# Patient Record
Sex: FEMALE | Race: OTHER | URBAN - METROPOLITAN AREA
[De-identification: names, ages, dates, MRNs, and addresses within clinical notes are randomized per-mention and may not be internally consistent; named-entity substitution may affect disease eponyms.]

---

## 2023-04-06 ENCOUNTER — EMERGENCY (EMERGENCY)
Facility: HOSPITAL | Age: 33
LOS: 0 days | Discharge: HOME | End: 2023-04-06
Attending: STUDENT IN AN ORGANIZED HEALTH CARE EDUCATION/TRAINING PROGRAM
Payer: MEDICAID

## 2023-04-06 VITALS
OXYGEN SATURATION: 98 % | HEART RATE: 85 BPM | DIASTOLIC BLOOD PRESSURE: 65 MMHG | SYSTOLIC BLOOD PRESSURE: 129 MMHG | WEIGHT: 199.96 LBS | TEMPERATURE: 98 F | RESPIRATION RATE: 14 BRPM

## 2023-04-06 VITALS
OXYGEN SATURATION: 100 % | SYSTOLIC BLOOD PRESSURE: 143 MMHG | RESPIRATION RATE: 16 BRPM | DIASTOLIC BLOOD PRESSURE: 65 MMHG | TEMPERATURE: 97 F

## 2023-04-06 PROCEDURE — 99283 EMERGENCY DEPT VISIT LOW MDM: CPT

## 2023-04-06 PROCEDURE — 99284 EMERGENCY DEPT VISIT MOD MDM: CPT

## 2023-04-06 NOTE — ED ADULT NURSE NOTE - NSFALLRSKUNASSIST_ED_ALL_ED
Effects and Expected Time Course of Masculinizing  Hormones      Effect Expected Onset Expected Maximum Effect   Skin oiliness/Acne 1-6 months 1-2 years   Cessation of menses 2-6 months n/a   Clitoral enlargement 3-6 months 1-2 years   Vaginal atrophy 3-6 months 1-2 years   Body fat redistribution 3-6 months 2-5 years   Facial/body hair growth 3-6 months 3-5 years    Deepened voice 3-12 months 1-2 years   Increased muscle mass/strength 6-12 months 2-5 years   Scalp hair loss >12 months+ Variable        supplies, then make a nurse-only appointment for injection teaching.    Follow up in 1 month for follow-up   no

## 2023-04-06 NOTE — ED PROVIDER NOTE - CLINICAL SUMMARY MEDICAL DECISION MAKING FREE TEXT BOX
32-year-old female with no relevant past medical history presenting with dehiscence to the  wound.  States that she had a  at [facility] on , felt a little bit of itching over the last couple of days, today while showering noted there was a small 1 cm opening.  Denies any fevers or significant abdominal pain.  Patient well-appearing, vital stable.  Noted to have approximate 1 cm area of dehiscence, superficial, minimal surrounding erythema, no significant purulent discharge.  No areas of induration.  Abdomen soft nontender nondistended.  OB/GYN consulted, evaluated patient, stable for discharge with oral antibiotics and close follow-up with GYN. bactrim prescription sent to [facility]. Return precautions discussed in detail with the patient.

## 2023-04-06 NOTE — ED PROVIDER NOTE - PHYSICAL EXAMINATION
CONSTITUTIONAL: Well-appearing; well-nourished; in no apparent distress.   EYES: PERRL; EOM intact.   ENT: normal nose; no rhinorrhea; normal pharynx with no tonsillar hypertrophy.   NECK: Supple; non-tender; no cervical lymphadenopathy.  CARDIOVASCULAR: Normal S1, S2; no murmurs, rubs, or gallops. Equal radial pulses  RESPIRATORY: Normal chest excursion with respiration; breath sounds clear and equal bilaterally; no wheezes, rhonchi, or rales.  GI/: Normal bowel sounds; non-distended; non-tender; no palpable organomegaly.   MS: No evidence of trauma or deformity. Normal ROM in all four extremities; non-tender to palpation; distal pulses are normal.   SKIN: 1cm area of wound dehiscence noted to c section site. No purulent drainage. No areas of fluctuance. No overlying erythema/warmth   NEURO/PSYCH: A & O x 4; grossly unremarkable. mood and manner are appropriate.

## 2023-04-06 NOTE — ED PROVIDER NOTE - NSFOLLOWUPINSTRUCTIONS_ED_ALL_ED_FT
PLEASE FOLLOW UP WITH YOUR GYN AS SOON AS POSSIBLE.     Wound Check  ImageIf you have a wound, it may take some time to heal. Eventually, a scar will form. The scar will also fade with time. It is important to take care of your wound while it is healing. This helps to protect your wound from infection.    How should I take care of my wound at home?  Some wounds are allowed to close on their own or are repaired at a later date. There are many different ways to close and cover a wound, including stitches (sutures), skin glue, and adhesive strips. Follow your health care provider's instructions about:    Wound care.  Bandage (dressing) changes and removal.  Wound closure removal.    Take medicines only as directed by your health care provider.  Keep all follow-up visits as directed by your health care provider. This is important.  Do not take baths, swim, or use a hot tub until your health care provider approves. You may shower as directed by your health care provider.  Keep your wound clean and dry.  What affects scar formation?  Scars affect each person differently. How your body scars depends on:    The location and size of your wound.  Traits that you inherited from your parents (genetic predisposition).  How you take care of your wound. Irritation and inflammation increase the amount of scar formation.  Sun exposure. This can darken a scar.    When should I call or see my health care provider?  Call or see your health care provider if:    You have redness, swelling, or pain at your wound site.  You have fluid, blood, or pus coming from your wound.  You have muscle aches, chills, or a general ill feeling.  You notice a bad smell coming from the wound.  Your wound separates after the sutures, staples, or skin adhesive strips have been removed.  You have persistent nausea or vomiting.  You have a fever.  You are dizzy.    When should I call 911 or go to the emergency room?  Call 911 or go to the emergency room if:    You faint.  You have difficulty breathing.    This information is not intended to replace advice given to you by your health care provider. Make sure you discuss any questions you have with your health care provider.

## 2023-04-06 NOTE — ED PROVIDER NOTE - NS ED ATTENDING STATEMENT MOD
This was a shared visit with the LUZ ELENA. I reviewed and verified the documentation and independently performed the documented:

## 2023-04-06 NOTE — CONSULT NOTE ADULT - SUBJECTIVE AND OBJECTIVE BOX
Chief Complaint: wound separation     HPI: 32y P3, s/p repeat  section (), with wound separation. States 2 days ago noticed redness and itching on right side of incision. Place used A&D ointment with no improvement. This morning she noticed separation of the wound at the right edge.  Patient has been lifting her toddler. Denies fevers, chills, nausea, vomiting, abdominal pain.      Ob/Gyn History:   NSVDx1, C/Sx2     Denies the following: constitutional symptoms, visual symptoms, cardiovascular symptoms, respiratory symptoms, GI symptoms, musculoskeletal symptoms, skin symptoms, neurologic symptoms, hematologic symptoms, allergic symptoms, psychiatric symptoms  Except any pertinent positives listed.     Home Medications:  Allergies  Intolerances  PAST MEDICAL & SURGICAL HISTORY:  FAMILY HISTORY:  SOCIAL HISTORY: Denies cigarette use, alcohol use, or illicit drug use    Vital Signs Last 24 Hrs  T(F): 97.3 (2023 15:54), Max: 98.3 (2023 14:21)  HR: 85 (2023 14:21) (85 - 85)  BP: 143/65 (2023 15:54) (129/65 - 143/65)  RR: 16 (2023 15:54) (14 - 16)    Weight (kg): 90.7 (23 @ 14:21)    UO:     General Appearance - AAOx3, NAD  Abdomen - Soft, nontender, nondistended, no rebound, no rigidity, no guarding, bowel sounds present  Pfannenstiel skin incision: R wound dehiscence 1cm wide, 0.5cm deep, with surround erythema 3cm x 3cm, no pus.  Rest of incision intact, nontender.       Meds:       Weight (kg): 90.7 (23 @ 14:21)    LABS:    RADIOLOGY & ADDITIONAL STUDIES:

## 2023-04-06 NOTE — ED PROVIDER NOTE - MDM ORDERS SUBMITTED SELECTION
[facility] for Tobacco Control email [email or web address]
Not Applicable

## 2023-04-06 NOTE — ED PROVIDER NOTE - OBJECTIVE STATEMENT
32 year old F with hx of c section 02/27 At [facility] presenting to er for evaluation of c section scar. Sts noticed itching to area with assoc clear drainage. Denies any assoc abdominal pain, fever/chills, nausea, vomiting, purulent drainage, surrounding redness/warmth.

## 2023-04-06 NOTE — ED PROVIDER NOTE - PATIENT PORTAL LINK FT
You can access the FollowMyHealth Patient Portal offered by [facility] by registering at the following website: [email or web address]. By joining [facility]’s FollowMyHealth portal, you will also be able to view your health information using other applications (apps) compatible with our system.

## 2023-04-06 NOTE — CONSULT NOTE ADULT - ASSESSMENT
32y P3, s/p  delivery by 5 weeks, with small wound dehiscence, hemodynamically and clinically stable.     - No acute gyn intervention  - Wound cleaned, flushed, and steri strip applied   - Recommend Bactrim DS BID x7 days  - Recommend following up with PMD in one week   - Dispo per ED    Dr. Lynch and Dr. Bucio aware.

## 2023-04-06 NOTE — ED PROVIDER NOTE - ATTENDING APP SHARED VISIT CONTRIBUTION OF CARE
I personally evaluated the patient. I reviewed the Resident’s or Physician Assistant’s note (as assigned above), and agree with the findings and plan except as documented in my note.    SEE MDM

## 2023-05-11 ENCOUNTER — NON-APPOINTMENT (OUTPATIENT)
Age: 33
End: 2023-05-11

## 2023-09-25 ENCOUNTER — OUTPATIENT (OUTPATIENT)
Dept: OUTPATIENT SERVICES | Facility: HOSPITAL | Age: 33
LOS: 1 days | End: 2023-09-25
Payer: COMMERCIAL

## 2023-09-25 DIAGNOSIS — K02.9 DENTAL CARIES, UNSPECIFIED: ICD-10-CM

## 2023-09-25 DIAGNOSIS — K01.0 EMBEDDED TEETH: ICD-10-CM

## 2023-09-25 PROCEDURE — D0140: CPT

## 2023-09-25 PROCEDURE — D0330: CPT

## 2023-10-30 ENCOUNTER — NON-APPOINTMENT (OUTPATIENT)
Age: 33
End: 2023-10-30

## 2024-01-04 ENCOUNTER — NON-APPOINTMENT (OUTPATIENT)
Age: 34
End: 2024-01-04

## 2024-02-01 PROBLEM — Z00.00 ENCOUNTER FOR PREVENTIVE HEALTH EXAMINATION: Status: ACTIVE | Noted: 2024-02-01

## 2024-02-02 ENCOUNTER — OUTPATIENT (OUTPATIENT)
Dept: OUTPATIENT SERVICES | Facility: HOSPITAL | Age: 34
LOS: 1 days | End: 2024-02-02
Payer: MEDICAID

## 2024-02-02 ENCOUNTER — APPOINTMENT (OUTPATIENT)
Dept: PODIATRY | Facility: CLINIC | Age: 34
End: 2024-02-02
Payer: MEDICAID

## 2024-02-02 VITALS — BODY MASS INDEX: 34.96 KG/M2 | WEIGHT: 190 LBS | HEIGHT: 62 IN

## 2024-02-02 DIAGNOSIS — Z00.00 ENCOUNTER FOR GENERAL ADULT MEDICAL EXAMINATION WITHOUT ABNORMAL FINDINGS: ICD-10-CM

## 2024-02-02 PROCEDURE — 99204 OFFICE O/P NEW MOD 45 MIN: CPT

## 2024-02-05 ENCOUNTER — NON-APPOINTMENT (OUTPATIENT)
Age: 34
End: 2024-02-05

## 2024-02-09 ENCOUNTER — OUTPATIENT (OUTPATIENT)
Dept: OUTPATIENT SERVICES | Facility: HOSPITAL | Age: 34
LOS: 1 days | End: 2024-02-09
Payer: MEDICAID

## 2024-02-09 ENCOUNTER — RESULT REVIEW (OUTPATIENT)
Age: 34
End: 2024-02-09

## 2024-02-09 DIAGNOSIS — M79.672 PAIN IN LEFT FOOT: ICD-10-CM

## 2024-02-09 DIAGNOSIS — Z00.8 ENCOUNTER FOR OTHER GENERAL EXAMINATION: ICD-10-CM

## 2024-02-09 PROCEDURE — 76881 US COMPL JOINT R-T W/IMG: CPT | Mod: LT

## 2024-02-09 PROCEDURE — 73630 X-RAY EXAM OF FOOT: CPT | Mod: 26,LT

## 2024-02-09 PROCEDURE — 76881 US COMPL JOINT R-T W/IMG: CPT | Mod: 26,LT

## 2024-02-09 PROCEDURE — 73630 X-RAY EXAM OF FOOT: CPT | Mod: LT

## 2024-02-10 DIAGNOSIS — M79.672 PAIN IN LEFT FOOT: ICD-10-CM

## 2024-02-13 NOTE — PHYSICAL EXAM
[Ankle Swelling Bilaterally] : bilaterally  [2+] : left foot dorsalis pedis 2+ [Skin Color & Pigmentation] : normal skin color and pigmentation [Sensation] : the sensory exam was normal to light touch and pinprick [Oriented To Time, Place, And Person] : oriented to person, place, and time [Ankle Swelling (On Exam)] : not present [Varicose Veins Of Lower Extremities] : not present [] : not present [de-identified] : Palpable mass on the dorsum of left midfoot, TTP, partially mobile, well, defined

## 2024-02-13 NOTE — ASSESSMENT
[FreeTextEntry1] : - Rx; Xray, Left foot - Rx; US & MRI left foot  - discussed surgery in detail with patient - RTC in 1 month to discuss treatment options

## 2024-02-15 DIAGNOSIS — M79.672 PAIN IN LEFT FOOT: ICD-10-CM
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
## 2024-02-15 DIAGNOSIS — R22.40 LOCALIZED SWELLING, MASS AND LUMP, UNSPECIFIED LOWER LIMB: ICD-10-CM
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
## 2024-03-04 ENCOUNTER — APPOINTMENT (OUTPATIENT)
Dept: PODIATRY | Facility: CLINIC | Age: 34
End: 2024-03-04
Payer: MEDICAID

## 2024-03-04 ENCOUNTER — OUTPATIENT (OUTPATIENT)
Dept: OUTPATIENT SERVICES | Facility: HOSPITAL | Age: 34
LOS: 1 days | End: 2024-03-04
Payer: MEDICAID

## 2024-03-04 DIAGNOSIS — R22.42 LOCALIZED SWELLING, MASS AND LUMP, LEFT LOWER LIMB: ICD-10-CM

## 2024-03-04 DIAGNOSIS — Z00.00 ENCOUNTER FOR GENERAL ADULT MEDICAL EXAMINATION WITHOUT ABNORMAL FINDINGS: ICD-10-CM

## 2024-03-04 DIAGNOSIS — I73.9 PERIPHERAL VASCULAR DISEASE, UNSPECIFIED: ICD-10-CM
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
## 2024-03-04 PROCEDURE — 99213 OFFICE O/P EST LOW 20 MIN: CPT

## 2024-03-12 NOTE — HISTORY OF PRESENT ILLNESS
[FreeTextEntry1] : PT reports pain to left foot with soft tissue mass.   Patient complains of pain in bilateral legs when walking long distances

## 2024-03-12 NOTE — PHYSICAL EXAM
[2+] : left foot dorsalis pedis 2+ [Sensation] : the sensory exam was normal to light touch and pinprick [FreeTextEntry1] : Left mid dorsal foot soft tissue mass.

## 2024-03-12 NOTE — ASSESSMENT
[FreeTextEntry1] : Patient evaluated history reviewed Left foot subcutaneous mass evaluated Patient complains of intermittent claudication should be evaluated by vascular surgery Patient should follow-up in 3 months

## 2024-03-14 ENCOUNTER — APPOINTMENT (OUTPATIENT)
Dept: VASCULAR SURGERY | Facility: CLINIC | Age: 34
End: 2024-03-14
Payer: MEDICAID

## 2024-03-14 VITALS — WEIGHT: 200 LBS | BODY MASS INDEX: 36.8 KG/M2 | HEIGHT: 62 IN

## 2024-03-14 VITALS — HEART RATE: 77 BPM | SYSTOLIC BLOOD PRESSURE: 118 MMHG | DIASTOLIC BLOOD PRESSURE: 78 MMHG

## 2024-03-14 DIAGNOSIS — I70.213 ATHEROSCLEROSIS OF NATIVE ARTERIES OF EXTREMITIES WITH INTERMITTENT CLAUDICATION, BILATERAL LEGS: ICD-10-CM

## 2024-03-14 PROCEDURE — 93925 LOWER EXTREMITY STUDY: CPT

## 2024-03-14 PROCEDURE — 99203 OFFICE O/P NEW LOW 30 MIN: CPT | Mod: 25

## 2024-03-14 NOTE — HISTORY OF PRESENT ILLNESS
[FreeTextEntry1] : 34 y/o F presents for evaluation of leg heaviness and pain, at times leg swelling, worse after prolonged standing, or walking. She was seen by Podiatry and is scheduled for removal of a mass on left foot. She also c/o lower back pain.

## 2024-03-14 NOTE — ASSESSMENT
[FreeTextEntry1] : 34 y/o F presents for evaluation of leg heaviness and pain, at times leg swelling, worse after prolonged standing, or walking. She was seen by Podiatry and is scheduled for removal of a mass on left foot.  Arterial duplex showed patent femoral, popliteal and tibial arteries bilaterally. No DVT noted in the lower extremities bilaterally. Symptoms could be referred from lower back. She has palpable pulses in the lower extremities bilaterally and no leg swelling on exam today.  No contraindication from a vascular standpoint to undergo foot surgery.  I, Dr. Jourdan Torre, personally performed the evaluation and management (E/M) services for this new patient. That E/M includes conducting the clinically appropriate initial history &/or exam, assessing all conditions, and establishing the plan of care. Today, my LUZ ELENA, Nanda Adler PA-C, was here to observe my evaluation and management service for this patient & follow plan of care established by me going forward.

## 2024-03-14 NOTE — DATA REVIEWED
[FreeTextEntry1] : I performed an arterial duplex which was medically necessary to evaluate for arterial insufficiency. It showed patent femoral, popliteal and tibial arteries bilaterally.   No DVT noted in the lower extremities bilaterally.

## 2024-04-09 ENCOUNTER — NON-APPOINTMENT (OUTPATIENT)
Age: 34
End: 2024-04-09

## 2024-04-11 ENCOUNTER — OUTPATIENT (OUTPATIENT)
Dept: OUTPATIENT SERVICES | Facility: HOSPITAL | Age: 34
LOS: 1 days | End: 2024-04-11
Payer: MEDICAID

## 2024-04-11 VITALS
HEIGHT: 62 IN | RESPIRATION RATE: 16 BRPM | OXYGEN SATURATION: 96 % | HEART RATE: 70 BPM | TEMPERATURE: 98 F | WEIGHT: 205.03 LBS | DIASTOLIC BLOOD PRESSURE: 60 MMHG | SYSTOLIC BLOOD PRESSURE: 100 MMHG

## 2024-04-11 DIAGNOSIS — Z01.818 ENCOUNTER FOR OTHER PREPROCEDURAL EXAMINATION: ICD-10-CM

## 2024-04-11 DIAGNOSIS — Z98.891 HISTORY OF UTERINE SCAR FROM PREVIOUS SURGERY: Chronic | ICD-10-CM

## 2024-04-11 DIAGNOSIS — R22.42 LOCALIZED SWELLING, MASS AND LUMP, LEFT LOWER LIMB: ICD-10-CM

## 2024-04-11 LAB
ALBUMIN SERPL ELPH-MCNC: 4.6 G/DL — SIGNIFICANT CHANGE UP (ref 3.5–5.2)
ALP SERPL-CCNC: 106 U/L — SIGNIFICANT CHANGE UP (ref 30–115)
ALT FLD-CCNC: 10 U/L — SIGNIFICANT CHANGE UP (ref 0–41)
ANION GAP SERPL CALC-SCNC: 15 MMOL/L — HIGH (ref 7–14)
AST SERPL-CCNC: 16 U/L — SIGNIFICANT CHANGE UP (ref 0–41)
BASOPHILS # BLD AUTO: 0.06 K/UL — SIGNIFICANT CHANGE UP (ref 0–0.2)
BASOPHILS NFR BLD AUTO: 0.7 % — SIGNIFICANT CHANGE UP (ref 0–1)
BILIRUB SERPL-MCNC: <0.2 MG/DL — SIGNIFICANT CHANGE UP (ref 0.2–1.2)
BUN SERPL-MCNC: 17 MG/DL — SIGNIFICANT CHANGE UP (ref 10–20)
CALCIUM SERPL-MCNC: 9.5 MG/DL — SIGNIFICANT CHANGE UP (ref 8.4–10.5)
CHLORIDE SERPL-SCNC: 104 MMOL/L — SIGNIFICANT CHANGE UP (ref 98–110)
CO2 SERPL-SCNC: 21 MMOL/L — SIGNIFICANT CHANGE UP (ref 17–32)
CREAT SERPL-MCNC: 0.6 MG/DL — LOW (ref 0.7–1.5)
EGFR: 121 ML/MIN/1.73M2 — SIGNIFICANT CHANGE UP
EOSINOPHIL # BLD AUTO: 0.44 K/UL — SIGNIFICANT CHANGE UP (ref 0–0.7)
EOSINOPHIL NFR BLD AUTO: 5.5 % — SIGNIFICANT CHANGE UP (ref 0–8)
GLUCOSE SERPL-MCNC: 90 MG/DL — SIGNIFICANT CHANGE UP (ref 70–99)
HCT VFR BLD CALC: 31.4 % — LOW (ref 37–47)
HGB BLD-MCNC: 9.3 G/DL — LOW (ref 12–16)
IMM GRANULOCYTES NFR BLD AUTO: 0.2 % — SIGNIFICANT CHANGE UP (ref 0.1–0.3)
LYMPHOCYTES # BLD AUTO: 2.75 K/UL — SIGNIFICANT CHANGE UP (ref 1.2–3.4)
LYMPHOCYTES # BLD AUTO: 34.1 % — SIGNIFICANT CHANGE UP (ref 20.5–51.1)
MCHC RBC-ENTMCNC: 21.4 PG — LOW (ref 27–31)
MCHC RBC-ENTMCNC: 29.6 G/DL — LOW (ref 32–37)
MCV RBC AUTO: 72.2 FL — LOW (ref 81–99)
MONOCYTES # BLD AUTO: 0.55 K/UL — SIGNIFICANT CHANGE UP (ref 0.1–0.6)
MONOCYTES NFR BLD AUTO: 6.8 % — SIGNIFICANT CHANGE UP (ref 1.7–9.3)
NEUTROPHILS # BLD AUTO: 4.24 K/UL — SIGNIFICANT CHANGE UP (ref 1.4–6.5)
NEUTROPHILS NFR BLD AUTO: 52.7 % — SIGNIFICANT CHANGE UP (ref 42.2–75.2)
NRBC # BLD: 0 /100 WBCS — SIGNIFICANT CHANGE UP (ref 0–0)
PLATELET # BLD AUTO: 366 K/UL — SIGNIFICANT CHANGE UP (ref 130–400)
PMV BLD: 12.3 FL — HIGH (ref 7.4–10.4)
POTASSIUM SERPL-MCNC: 4.3 MMOL/L — SIGNIFICANT CHANGE UP (ref 3.5–5)
POTASSIUM SERPL-SCNC: 4.3 MMOL/L — SIGNIFICANT CHANGE UP (ref 3.5–5)
PROT SERPL-MCNC: 7.4 G/DL — SIGNIFICANT CHANGE UP (ref 6–8)
RBC # BLD: 4.35 M/UL — SIGNIFICANT CHANGE UP (ref 4.2–5.4)
RBC # FLD: 17.3 % — HIGH (ref 11.5–14.5)
SODIUM SERPL-SCNC: 140 MMOL/L — SIGNIFICANT CHANGE UP (ref 135–146)
WBC # BLD: 8.06 K/UL — SIGNIFICANT CHANGE UP (ref 4.8–10.8)
WBC # FLD AUTO: 8.06 K/UL — SIGNIFICANT CHANGE UP (ref 4.8–10.8)

## 2024-04-11 PROCEDURE — 36415 COLL VENOUS BLD VENIPUNCTURE: CPT

## 2024-04-11 PROCEDURE — 85025 COMPLETE CBC W/AUTO DIFF WBC: CPT

## 2024-04-11 PROCEDURE — 80053 COMPREHEN METABOLIC PANEL: CPT

## 2024-04-11 PROCEDURE — 99214 OFFICE O/P EST MOD 30 MIN: CPT | Mod: 25

## 2024-04-11 NOTE — H&P PST ADULT - BLOOD TRANSFUSION, PREVIOUS, PROFILE
RE - Provisional Discharge - IRTS - [facility]    Immediate opening is available     Intake interview - 8:30am - 9/13/17 - on unit with Mimi MENDEZ director of above IRTS    This writer spoke with above  first about interviewing for bed available early next week. Spoke with her again before 5pm at which point she reports now has immediate opening would like to see patient in the am. discussed case with her again at length. Will check in with her when she comes to meet with patient    no

## 2024-04-11 NOTE — H&P PST ADULT - HISTORY OF PRESENT ILLNESS
33yr old female states has had a lump on top of the LT foot for about 1 yr " it's gotten bigger and it hurts when I walk a lot" presents to PAST in prep for MASS REMOVAL LEFT FOOT. Denies COVID /URI S/S.  Anesthesia Alert  YES --Difficult Airway CLASS 4  NO--History of neck surgery or radiation  NO--Limited ROM of neck  NO--History of Malignant hyperthermia  NO--Personal or family history of Pseudocholinesterase deficiency  NO--Prior Anesthesia Complication  NO--Latex Allergy  NO--Loose teeth  NO--History of Rheumatoid Arthritis  NO--ADELITA  No Bleeding risk  NO--Other_____   [employer] Activity Status Index (DASI) from [employer]  on 4/11/2024    RESULT SUMMARY:  58.2 points  The higher the score (maximum 58.2), the higher the functional status.    9.89 METs        INPUTS:  Take care of self —> 2.75 = Yes  Walk indoors —> 1.75 = Yes  Walk 1&ndash;2 blocks on level ground —> 2.75 = Yes  Climb a flight of stairs or walk up a hill —> 5.5 = Yes  Run a short distance —> 8 = Yes  Do light work around the house —> 2.7 = Yes  Do moderate work around the house —> 3.5 = Yes  Do heavy work around the house —> 8 = Yes  Do yardwork —> 4.5 = Yes  Have sexual relations —> 5.25 = Yes  Participate in moderate recreational activities —> 6 = Yes  Participate in strenuous sports —> 7.5 = Yes  Revised Cardiac Risk Index for Pre-Operative Risk from [employer]  on 4/11/2024  ** All calculations should be rechecked by clinician prior to use **    RESULT SUMMARY:  0 points  Class I Risk    3.9 %  30-day risk of death, MI, or cardiac arrest      INPUTS:  Elevated-risk surgery —> 0 = No  History of ischemic heart disease —> 0 = No  History of congestive heart failure —> 0 = No  History of cerebrovascular disease —> 0 = No  Pre-operative treatment with insulin —> 0 = No  Pre-operative creatinine >2 mg/dL / 176.8 µmol/L —> 0 = No

## 2024-04-12 DIAGNOSIS — Z01.818 ENCOUNTER FOR OTHER PREPROCEDURAL EXAMINATION: ICD-10-CM

## 2024-04-12 DIAGNOSIS — R22.42 LOCALIZED SWELLING, MASS AND LUMP, LEFT LOWER LIMB: ICD-10-CM

## 2024-04-24 NOTE — ASU PATIENT PROFILE, ADULT - HISTORY OF COVID-19 VACCINATION
----- Message from Fartun Mittal sent at 11/20/2019  8:21 AM PST -----  Regarding: Non-Urgent Medical Question  Contact: [phone]  Ernie Mosquera,  Can I get a refill on my Phentermine?   Thanks,  Krissy   Yes

## 2024-04-25 ENCOUNTER — OUTPATIENT (OUTPATIENT)
Dept: OUTPATIENT SERVICES | Facility: HOSPITAL | Age: 34
LOS: 1 days | Discharge: HOME | End: 2024-04-25
Payer: MEDICAID

## 2024-04-25 ENCOUNTER — TRANSCRIPTION ENCOUNTER (OUTPATIENT)
Age: 34
End: 2024-04-25

## 2024-04-25 ENCOUNTER — RESULT REVIEW (OUTPATIENT)
Age: 34
End: 2024-04-25

## 2024-04-25 VITALS
DIASTOLIC BLOOD PRESSURE: 58 MMHG | HEART RATE: 70 BPM | TEMPERATURE: 98 F | RESPIRATION RATE: 17 BRPM | HEIGHT: 62 IN | OXYGEN SATURATION: 98 % | SYSTOLIC BLOOD PRESSURE: 119 MMHG | WEIGHT: 205.03 LBS

## 2024-04-25 VITALS
HEART RATE: 65 BPM | OXYGEN SATURATION: 100 % | DIASTOLIC BLOOD PRESSURE: 57 MMHG | SYSTOLIC BLOOD PRESSURE: 99 MMHG | RESPIRATION RATE: 14 BRPM

## 2024-04-25 DIAGNOSIS — R22.42 LOCALIZED SWELLING, MASS AND LUMP, LEFT LOWER LIMB: ICD-10-CM

## 2024-04-25 DIAGNOSIS — Z98.891 HISTORY OF UTERINE SCAR FROM PREVIOUS SURGERY: Chronic | ICD-10-CM

## 2024-04-25 PROCEDURE — 28041 EXC FOOT/TOE TUM DEP 1.5CM/>: CPT

## 2024-04-25 PROCEDURE — 88304 TISSUE EXAM BY PATHOLOGIST: CPT | Mod: 26

## 2024-04-25 PROCEDURE — 88304 TISSUE EXAM BY PATHOLOGIST: CPT

## 2024-04-25 RX ORDER — CEFADROXIL 500 MG/1
500 CAPSULE ORAL TWICE DAILY
Qty: 28 | Refills: 0 | Status: ACTIVE | COMMUNITY
Start: 2024-04-25 | End: 1900-01-01

## 2024-04-25 RX ORDER — HYDROMORPHONE HYDROCHLORIDE 2 MG/ML
1 INJECTION INTRAMUSCULAR; INTRAVENOUS; SUBCUTANEOUS
Refills: 0 | Status: DISCONTINUED | OUTPATIENT
Start: 2024-04-25 | End: 2024-04-25

## 2024-04-25 RX ORDER — OXYCODONE AND ACETAMINOPHEN 5; 325 MG/1; MG/1
5-325 TABLET ORAL
Qty: 30 | Refills: 0 | Status: ACTIVE | COMMUNITY
Start: 2024-04-25 | End: 1900-01-01

## 2024-04-25 RX ORDER — SODIUM CHLORIDE 9 MG/ML
1000 INJECTION, SOLUTION INTRAVENOUS
Refills: 0 | Status: DISCONTINUED | OUTPATIENT
Start: 2024-04-25 | End: 2024-04-25

## 2024-04-25 RX ORDER — ONDANSETRON 8 MG/1
4 TABLET, FILM COATED ORAL ONCE
Refills: 0 | Status: DISCONTINUED | OUTPATIENT
Start: 2024-04-25 | End: 2024-04-25

## 2024-04-25 RX ORDER — MEPERIDINE HYDROCHLORIDE 50 MG/ML
12.5 INJECTION INTRAMUSCULAR; INTRAVENOUS; SUBCUTANEOUS ONCE
Refills: 0 | Status: DISCONTINUED | OUTPATIENT
Start: 2024-04-25 | End: 2024-04-25

## 2024-04-25 RX ORDER — HYDROMORPHONE HYDROCHLORIDE 2 MG/ML
0.5 INJECTION INTRAMUSCULAR; INTRAVENOUS; SUBCUTANEOUS
Refills: 0 | Status: DISCONTINUED | OUTPATIENT
Start: 2024-04-25 | End: 2024-04-25

## 2024-04-25 RX ORDER — ACETAMINOPHEN 500 MG
1000 TABLET ORAL ONCE
Refills: 0 | Status: DISCONTINUED | OUTPATIENT
Start: 2024-04-25 | End: 2024-04-25

## 2024-04-25 RX ORDER — OXYCODONE HYDROCHLORIDE 5 MG/1
5 TABLET ORAL ONCE
Refills: 0 | Status: DISCONTINUED | OUTPATIENT
Start: 2024-04-25 | End: 2024-04-25

## 2024-04-25 NOTE — ASU DISCHARGE PLAN (ADULT/PEDIATRIC) - CARE PROVIDER_API CALL
Javy Christianson  Podiatric Medicine and Surgery  [address], [address]  [place] [address]  Phone: [phone]  Fax: [phone]  Established Patient  Follow Up Time: 1 week

## 2024-04-25 NOTE — CHART NOTE - NSCHARTNOTEFT_GEN_A_CORE
PACU ANESTHESIA ADMISSION NOTE    Procedure: Excision of mass of left foot  Post op diagnosis:  Mass of soft tissue of foot    _x___  Patent Airway    _x___  Full return of protective reflexes    _x___  Full recovery from anesthesia / back to baseline status    Vitals:  T(C): 97.6F  HR: 72  BP: 100/52  RR: 19  SpO2: 100%    Mental Status:  __x__ Awake   _x____ Alert   _____ Drowsy   _____ Sedated    Nausea/Vomiting:  __x__ NO  ______Yes,   See Post - Op Orders          Pain Scale (0-10):  _____    Treatment: ____ None    __x__ See Post - Op/PCA Orders    Post - Operative Fluids:   ____ Oral   __x__ See Post - Op Orders    Plan: Discharge:   _x___Home       _____Floor     _____Critical Care    _____  Other:_________________    Comments: uneventful anesthesia course no complications. Vitals stable. Pt transferred to PACU. Discharge home when criteria is met

## 2024-04-26 LAB — SURGICAL PATHOLOGY STUDY: SIGNIFICANT CHANGE UP

## 2024-04-29 PROBLEM — E66.9 OBESITY, UNSPECIFIED: Chronic | Status: ACTIVE | Noted: 2024-04-11

## 2024-05-02 DIAGNOSIS — E66.9 OBESITY, UNSPECIFIED: ICD-10-CM

## 2024-05-02 DIAGNOSIS — M67.472 GANGLION, LEFT ANKLE AND FOOT: ICD-10-CM

## 2024-05-02 DIAGNOSIS — R22.42 LOCALIZED SWELLING, MASS AND LUMP, LEFT LOWER LIMB: ICD-10-CM

## 2024-05-03 ENCOUNTER — OUTPATIENT (OUTPATIENT)
Dept: OUTPATIENT SERVICES | Facility: HOSPITAL | Age: 34
LOS: 1 days | End: 2024-05-03
Payer: MEDICAID

## 2024-05-03 ENCOUNTER — APPOINTMENT (OUTPATIENT)
Dept: PODIATRY | Facility: CLINIC | Age: 34
End: 2024-05-03
Payer: MEDICAID

## 2024-05-03 DIAGNOSIS — Z98.891 HISTORY OF UTERINE SCAR FROM PREVIOUS SURGERY: Chronic | ICD-10-CM

## 2024-05-03 DIAGNOSIS — Z00.00 ENCOUNTER FOR GENERAL ADULT MEDICAL EXAMINATION WITHOUT ABNORMAL FINDINGS: ICD-10-CM

## 2024-05-03 PROCEDURE — 99204 OFFICE O/P NEW MOD 45 MIN: CPT

## 2024-05-03 PROCEDURE — 99024 POSTOP FOLLOW-UP VISIT: CPT

## 2024-05-07 NOTE — HISTORY OF PRESENT ILLNESS
[FreeTextEntry1] : PT reports pain to left foot with soft tissue mass.  Surgical removal 1 week prior of ganglion cyst reports some pain and swelling today but managing with otc ibuprofen

## 2024-05-07 NOTE — ASSESSMENT
[Verbal] : verbal [Patient] : patient [FreeTextEntry1] : Discussed with pt to wrap foot in ACE bandage in order to manage swelling  Continue ambulating with surgical shoe  rtc 1 week for suture removal

## 2024-05-10 ENCOUNTER — OUTPATIENT (OUTPATIENT)
Dept: OUTPATIENT SERVICES | Facility: HOSPITAL | Age: 34
LOS: 1 days | End: 2024-05-10
Payer: MEDICAID

## 2024-05-10 ENCOUNTER — APPOINTMENT (OUTPATIENT)
Dept: PODIATRY | Facility: CLINIC | Age: 34
End: 2024-05-10
Payer: MEDICAID
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
## 2024-05-10 DIAGNOSIS — R22.42 LOCALIZED SWELLING, MASS AND LUMP, LEFT LOWER LIMB: ICD-10-CM

## 2024-05-10 DIAGNOSIS — Z98.891 HISTORY OF UTERINE SCAR FROM PREVIOUS SURGERY: Chronic | ICD-10-CM
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
## 2024-05-10 DIAGNOSIS — Z00.00 ENCOUNTER FOR GENERAL ADULT MEDICAL EXAMINATION WITHOUT ABNORMAL FINDINGS: ICD-10-CM

## 2024-05-10 PROCEDURE — 99204 OFFICE O/P NEW MOD 45 MIN: CPT

## 2024-05-10 PROCEDURE — 99024 POSTOP FOLLOW-UP VISIT: CPT

## 2024-05-20 PROBLEM — R22.42 SUBCUTANEOUS MASS OF LEFT FOOT: Status: ACTIVE | Noted: 2024-02-02

## 2024-05-20 NOTE — HISTORY OF PRESENT ILLNESS
[Sneakers] : hola [FreeTextEntry1] : PT reports pain to left foot with soft tissue mass.  Surgical removal 2 weeks prior of ganglion cyst reports some pain and swelling today but managing with otc ibuprofen

## 2024-05-20 NOTE — PHYSICAL EXAM
[General Appearance - Alert] : alert [General Appearance - In No Acute Distress] : in no acute distress [2+] : left foot dorsalis pedis 2+ [Sensation] : the sensory exam was normal to light touch and pinprick [Delayed in the Right Toes] : capillary refills normal in right toes [Delayed in the Left Toes] : capillary refills normal in the left toes [FreeTextEntry1] : Left foot dorsum surgical site with sutures intact and well co-apted skin no opening, no drainage noted mild surrounding edema non erythema or SOI

## 2024-05-20 NOTE — ASSESSMENT
[Verbal] : verbal [Patient] : patient [FreeTextEntry1] : S/p ganglion cyst removal Left foot  Sutures removed Steri strips applied Instructed patient to continue keeping it dry and covered RTC 2 weeks

## 2024-05-24 ENCOUNTER — APPOINTMENT (OUTPATIENT)
Dept: PODIATRY | Facility: CLINIC | Age: 34
End: 2024-05-24

## 2024-05-24 DIAGNOSIS — R22.42 LOCALIZED SWELLING, MASS AND LUMP, LEFT LOWER LIMB: ICD-10-CM

## 2024-06-27 ENCOUNTER — APPOINTMENT (OUTPATIENT)
Dept: PODIATRY | Facility: CLINIC | Age: 34
End: 2024-06-27
Payer: MEDICAID

## 2024-06-27 ENCOUNTER — OUTPATIENT (OUTPATIENT)
Dept: OUTPATIENT SERVICES | Facility: HOSPITAL | Age: 34
LOS: 1 days | End: 2024-06-27
Payer: MEDICAID

## 2024-06-27 DIAGNOSIS — Z98.891 HISTORY OF UTERINE SCAR FROM PREVIOUS SURGERY: Chronic | ICD-10-CM

## 2024-06-27 DIAGNOSIS — L97.521 NON-PRESSURE CHRONIC ULCER OF OTHER PART OF LEFT FOOT LIMITED TO BREAKDOWN OF SKIN: ICD-10-CM

## 2024-06-27 DIAGNOSIS — Z00.00 ENCOUNTER FOR GENERAL ADULT MEDICAL EXAMINATION WITHOUT ABNORMAL FINDINGS: ICD-10-CM

## 2024-06-27 DIAGNOSIS — M79.672 PAIN IN LEFT FOOT: ICD-10-CM

## 2024-06-27 PROCEDURE — 99213 OFFICE O/P EST LOW 20 MIN: CPT | Mod: 24
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
## 2024-06-28 DIAGNOSIS — L97.521 NON-PRESSURE CHRONIC ULCER OF OTHER PART OF LEFT FOOT LIMITED TO BREAKDOWN OF SKIN: ICD-10-CM

## 2024-06-28 DIAGNOSIS — M79.672 PAIN IN LEFT FOOT: ICD-10-CM
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
## 2024-10-03 ENCOUNTER — NON-APPOINTMENT (OUTPATIENT)
Age: 34
End: 2024-10-03

## 2024-10-13 ENCOUNTER — NON-APPOINTMENT (OUTPATIENT)
Age: 34
End: 2024-10-13

## 2025-03-14 ENCOUNTER — NON-APPOINTMENT (OUTPATIENT)
Age: 35
End: 2025-03-14

## 2025-03-29 ENCOUNTER — NON-APPOINTMENT (OUTPATIENT)
Age: 35
End: 2025-03-29

## 2025-07-29 ENCOUNTER — EMERGENCY (EMERGENCY)
Facility: HOSPITAL | Age: 35
LOS: 0 days | Discharge: HOME | End: 2025-07-30
Attending: EMERGENCY MEDICINE
Payer: MEDICAID

## 2025-07-29 VITALS
OXYGEN SATURATION: 99 % | HEIGHT: 62 IN | WEIGHT: 201.72 LBS | HEART RATE: 81 BPM | SYSTOLIC BLOOD PRESSURE: 147 MMHG | TEMPERATURE: 98 F | DIASTOLIC BLOOD PRESSURE: 88 MMHG | RESPIRATION RATE: 18 BRPM

## 2025-07-29 DIAGNOSIS — Z91.018 ALLERGY TO OTHER FOODS: ICD-10-CM

## 2025-07-29 DIAGNOSIS — R10.30 LOWER ABDOMINAL PAIN, UNSPECIFIED: ICD-10-CM

## 2025-07-29 DIAGNOSIS — O34.81 MATERNAL CARE FOR OTHER ABNORMALITIES OF PELVIC ORGANS, FIRST TRIMESTER: ICD-10-CM

## 2025-07-29 DIAGNOSIS — Z98.891 HISTORY OF UTERINE SCAR FROM PREVIOUS SURGERY: Chronic | ICD-10-CM

## 2025-07-29 DIAGNOSIS — O99.891 OTHER SPECIFIED DISEASES AND CONDITIONS COMPLICATING PREGNANCY: ICD-10-CM

## 2025-07-29 DIAGNOSIS — R74.8 ABNORMAL LEVELS OF OTHER SERUM ENZYMES: ICD-10-CM

## 2025-07-29 DIAGNOSIS — O20.8 OTHER HEMORRHAGE IN EARLY PREGNANCY: ICD-10-CM

## 2025-07-29 DIAGNOSIS — N83.201 UNSPECIFIED OVARIAN CYST, RIGHT SIDE: ICD-10-CM

## 2025-07-29 DIAGNOSIS — Z3A.10 10 WEEKS GESTATION OF PREGNANCY: ICD-10-CM

## 2025-07-29 DIAGNOSIS — O20.0 THREATENED ABORTION: ICD-10-CM

## 2025-07-29 LAB
APPEARANCE UR: CLEAR — SIGNIFICANT CHANGE UP
BASOPHILS # BLD AUTO: 0.05 K/UL — SIGNIFICANT CHANGE UP (ref 0–0.2)
BASOPHILS NFR BLD AUTO: 0.6 % — SIGNIFICANT CHANGE UP (ref 0–1)
BILIRUB UR-MCNC: NEGATIVE — SIGNIFICANT CHANGE UP
COLOR SPEC: YELLOW — SIGNIFICANT CHANGE UP
DIFF PNL FLD: ABNORMAL
EOSINOPHIL # BLD AUTO: 0.19 K/UL — SIGNIFICANT CHANGE UP (ref 0–0.7)
EOSINOPHIL NFR BLD AUTO: 2.1 % — SIGNIFICANT CHANGE UP (ref 0–8)
GLUCOSE UR QL: NEGATIVE MG/DL — SIGNIFICANT CHANGE UP
HCT VFR BLD CALC: 39 % — SIGNIFICANT CHANGE UP (ref 37–47)
HGB BLD-MCNC: 12.6 G/DL — SIGNIFICANT CHANGE UP (ref 12–16)
IMM GRANULOCYTES NFR BLD AUTO: 0.3 % — SIGNIFICANT CHANGE UP (ref 0.1–0.3)
KETONES UR QL: NEGATIVE MG/DL — SIGNIFICANT CHANGE UP
LEUKOCYTE ESTERASE UR-ACNC: NEGATIVE — SIGNIFICANT CHANGE UP
LYMPHOCYTES # BLD AUTO: 2.51 K/UL — SIGNIFICANT CHANGE UP (ref 1.2–3.4)
LYMPHOCYTES # BLD AUTO: 28.3 % — SIGNIFICANT CHANGE UP (ref 20.5–51.1)
MCHC RBC-ENTMCNC: 26.7 PG — LOW (ref 27–31)
MCHC RBC-ENTMCNC: 32.3 G/DL — SIGNIFICANT CHANGE UP (ref 32–37)
MCV RBC AUTO: 82.6 FL — SIGNIFICANT CHANGE UP (ref 81–99)
MONOCYTES # BLD AUTO: 0.73 K/UL — HIGH (ref 0.1–0.6)
MONOCYTES NFR BLD AUTO: 8.2 % — SIGNIFICANT CHANGE UP (ref 1.7–9.3)
NEUTROPHILS # BLD AUTO: 5.37 K/UL — SIGNIFICANT CHANGE UP (ref 1.4–6.5)
NEUTROPHILS NFR BLD AUTO: 60.5 % — SIGNIFICANT CHANGE UP (ref 42.2–75.2)
NITRITE UR-MCNC: NEGATIVE — SIGNIFICANT CHANGE UP
NRBC BLD AUTO-RTO: 0 /100 WBCS — SIGNIFICANT CHANGE UP (ref 0–0)
PH UR: 7 — SIGNIFICANT CHANGE UP (ref 5–8)
PLATELET # BLD AUTO: 310 K/UL — SIGNIFICANT CHANGE UP (ref 130–400)
PMV BLD: 12.1 FL — HIGH (ref 7.4–10.4)
PROT UR-MCNC: NEGATIVE MG/DL — SIGNIFICANT CHANGE UP
RBC # BLD: 4.72 M/UL — SIGNIFICANT CHANGE UP (ref 4.2–5.4)
RBC # FLD: 14.6 % — HIGH (ref 11.5–14.5)
SP GR SPEC: 1 — SIGNIFICANT CHANGE UP (ref 1–1.03)
UROBILINOGEN FLD QL: 0.2 MG/DL — SIGNIFICANT CHANGE UP (ref 0.2–1)
WBC # BLD: 8.88 K/UL — SIGNIFICANT CHANGE UP (ref 4.8–10.8)
WBC # FLD AUTO: 8.88 K/UL — SIGNIFICANT CHANGE UP (ref 4.8–10.8)

## 2025-07-29 PROCEDURE — 36415 COLL VENOUS BLD VENIPUNCTURE: CPT

## 2025-07-29 PROCEDURE — 86850 RBC ANTIBODY SCREEN: CPT

## 2025-07-29 PROCEDURE — 99284 EMERGENCY DEPT VISIT MOD MDM: CPT

## 2025-07-29 PROCEDURE — 76817 TRANSVAGINAL US OBSTETRIC: CPT

## 2025-07-29 PROCEDURE — 76815 OB US LIMITED FETUS(S): CPT

## 2025-07-29 PROCEDURE — 86901 BLOOD TYPING SEROLOGIC RH(D): CPT

## 2025-07-29 PROCEDURE — 87086 URINE CULTURE/COLONY COUNT: CPT

## 2025-07-29 PROCEDURE — 81001 URINALYSIS AUTO W/SCOPE: CPT

## 2025-07-29 PROCEDURE — 99284 EMERGENCY DEPT VISIT MOD MDM: CPT | Mod: 25

## 2025-07-29 PROCEDURE — 86900 BLOOD TYPING SEROLOGIC ABO: CPT

## 2025-07-29 PROCEDURE — 80053 COMPREHEN METABOLIC PANEL: CPT

## 2025-07-29 PROCEDURE — 85025 COMPLETE CBC W/AUTO DIFF WBC: CPT

## 2025-07-29 PROCEDURE — 84702 CHORIONIC GONADOTROPIN TEST: CPT

## 2025-07-29 RX ADMIN — Medication 1000 MILLILITER(S): at 23:06

## 2025-07-29 NOTE — ED PROVIDER NOTE - PHYSICAL EXAMINATION
As Follows:  CONST: Well appearing in NAD  EYES: PERRL, EOMI, Sclera and conjunctiva clear.   ENT:  No nasal discharge. Oropharynx normal appearing, no erythema / exudates. Uvula midline. Airway intact.   CARD: No murmurs, rubs, or gallops; Normal rate and rhythm  RESP: BS Equal B/L, No wheezes, rhonchi or rales. No distress or accessory breathing  GI: RLQ tenderness with suprapubic discomfort. Soft, non-distended.   SKIN: Warm, dry, no acute rashes. MMM  NEURO: Alert and Oriented, No focal deficits. Strength and sensation intact. Steady gait

## 2025-07-29 NOTE — ED PROVIDER NOTE - OBJECTIVE STATEMENT
Patient is a 34 year old female with no pmhx currently , LMP  at approx 10 weeks gestation presents for evaluation of lower abdominal to right lower back discomfort  with episode of vaginal bleeding today after 8am. She reports intrauterine pregnancy confirmed via vaginal US done about 2 weeks ago with [facility]. She denies any other complaints including dysuria/ urgency/ frequency/ or continued vaginal bleeding.

## 2025-07-29 NOTE — ED PROVIDER NOTE - CLINICAL SUMMARY MEDICAL DECISION MAKING FREE TEXT BOX
34 year old female with no PMH currently , LMP  at approx 10 weeks gestation presents for evaluation of lower abdominal to right lower back discomfort  with episode of vaginal bleeding today after 8am. She reports intrauterine pregnancy confirmed via vaginal US done about 2 weeks ago with [facility]el[facility] Ojeda OB G[facility]. She denies any other complaints including dysuria/ urgency/ frequency/ or continued vaginal bleeding. Only 1 episode of vaginal bleed, no bleeding in the ER. Pt had labs, urine, US --noted to have some blood in urine but no infection, US shows 4.5 cm right ovarian cyst which could be cause of her pain. +small MARRY but pt is blood type O+, no rhogam necessary. Bleeding resolved. Pt requesting to go home. Will follow up with her OB/GYN. Return to ER for any worsening of symptoms.

## 2025-07-29 NOTE — ED PROVIDER NOTE - NSFOLLOWUPINSTRUCTIONS_ED_ALL_ED_FT
[facility] Care Support Interaction      I have reviewed the chart of Wilmer Aponte who is hospitalized for Sepsis. The patient is currently located in the following unit: [facility]        I have assisted the primary physician in management of the following:      S/P OHTX, immunosuppressed- daily tacro level ordered (to be drawn prior to morning dose), enhanced respiratory precautions, blood cultures x 2 (done at OSH prior to transfer as well, has already received IV abx)        Nikki Sanchez PA-C  [facility]       Please follow up with your primary care doctor and OB/GYN in 1-3 days   Please be aware of any new or worsening signs or symptoms that should prompt your return to the ER.    Threatened Miscarriage    A threatened miscarriage occurs when you have vaginal bleeding during your first 20 weeks of pregnancy but the pregnancy has not ended. If you have vaginal bleeding during this time, your health care provider will do tests to make sure you are still pregnant. If the tests show you are still pregnant and the developing baby (fetus) inside your womb (uterus) is still growing, your condition is considered a threatened miscarriage.    A threatened miscarriage does not mean your pregnancy will end, but it does increase the risk of losing your pregnancy (complete miscarriage).    CAUSES  The cause of a threatened miscarriage is usually not known. If you go on to have a complete miscarriage, the most common cause is an abnormal number of chromosomes in the developing baby. Chromosomes are the structures inside cells that hold all your genetic material.    Some causes of vaginal bleeding that do not result in miscarriage include:    Having sex.  Having an infection.  Normal hormone changes of pregnancy.  Bleeding that occurs when an egg implants in your uterus.    RISK FACTORS  Risk factors for bleeding in early pregnancy include:    Obesity.  Smoking.  Drinking excessive amounts of alcohol or caffeine.  Recreational drug use.    SIGNS AND SYMPTOMS  Light vaginal bleeding.   Mild abdominal pain or cramps.     DIAGNOSIS  If you have bleeding with or without abdominal pain before 20 weeks of pregnancy, your health care provider will do tests to check whether you are still pregnant. One important test involves using sound waves and a computer (ultrasound) to create images of the inside of your uterus. Other tests include an internal exam of your vagina and uterus (pelvic exam) and measurement of your baby's heart rate.     You may be diagnosed with a threatened miscarriage if:    Ultrasound testing shows you are still pregnant.  Your baby's heart rate is strong.  A pelvic exam shows that the opening between your uterus and your vagina (cervix) is closed.  Your heart rate and blood pressure are stable.  Blood tests confirm you are still pregnant.    TREATMENT  No treatments have been shown to prevent a threatened miscarriage from going on to a complete miscarriage. However, the right home care is important.     HOME CARE INSTRUCTIONS  Make sure you keep all your appointments for prenatal care. This is very important.  Get plenty of rest.  Do not have sex or use tampons if you have vaginal bleeding.  Do not douche.  Do not smoke or use recreational drugs.   Do not drink alcohol.   Avoid caffeine.     SEEK MEDICAL CARE IF:  You have light vaginal bleeding or spotting while pregnant.   You have abdominal pain or cramping.   You have a fever.     SEEK IMMEDIATE MEDICAL CARE IF:  You have heavy vaginal bleeding.   You have blood clots coming from your vagina.   You have severe low back pain or abdominal cramps.   You have fever, chills, and severe abdominal pain.     MAKE SURE YOU:  Understand these instructions.  Will watch your condition.  Will get help right away if you are not doing well or get worse.    ADDITIONAL NOTES AND INSTRUCTIONS    Please follow up with your Primary MD in 24-48 hr.  Seek immediate medical care for any new/worsening signs or symptoms.      Subchorionic Hematoma    A subchorionic hematoma is a gathering of blood between the outer wall of the placenta and the inner wall of the womb (uterus). The placenta is the organ that connects the fetus to the wall of the uterus. The placenta performs the feeding, breathing (oxygen to the fetus), and waste removal (excretory work) of the fetus.     Subchorionic hematoma is the most common abnormality found on a result from ultrasonography done during the first trimester or early second trimester of pregnancy. If there has been little or no vaginal bleeding, early small hematomas usually shrink on their own and do not affect your baby or pregnancy. The blood is gradually absorbed over 1–2 weeks. When bleeding starts later in pregnancy or the hematoma is larger or occurs in an older pregnant woman, the outcome may not be as good. Larger hematomas may get bigger, which increases the chances for miscarriage. Subchorionic hematoma also increases the risk of premature detachment of the placenta from the uterus,  (premature) labor, and stillbirth.    HOME CARE INSTRUCTIONS  Stay on bed rest if your health care provider recommends this. Although bed rest will not prevent more bleeding or prevent a miscarriage, your health care provider may recommend bed rest until you are advised otherwise.  Avoid heavy lifting (more than 10 lb [4.5 kg]), exercise, sexual intercourse, or douching as directed by your health care provider.  Keep track of the number of pads you use each day and how soaked (saturated) they are. Write down this information.  Do not use tampons.  Keep all follow-up appointments as directed by your health care provider. Your health care provider may ask you to have follow-up blood tests or ultrasound tests or both.    SEEK IMMEDIATE MEDICAL CARE IF:  You have severe cramps in your stomach, back, abdomen, or pelvis.  You have a fever.  You pass large clots or tissue. Save any tissue for your health care provider to look at.  Your bleeding increases or you become lightheaded, feel weak, or have fainting episodes.    ADDITIONAL NOTES AND INSTRUCTIONS    Please follow up with your Primary MD in 24-48 hr.  Seek immediate medical care for any new/worsening signs or symptoms.    Ovarian Cyst    WHAT YOU NEED TO KNOW:    An ovarian cyst is a sac that grows on an ovary. This sac usually contains fluid, but may sometimes have blood or tissue in it. Most ovarian cysts are harmless and go away without treatment in a few months. Some cysts can grow large, cause pain, or break open.     DISCHARGE INSTRUCTIONS:    Call 911 for any of the following:     You are too weak or dizzy to stand up.        Return to the emergency department if:     You have severe abdominal pain. The pain may be sharp and sudden.      You have a fever.    Contact your healthcare provider if:     Your periods are early, late, or more painful than usual.      You have bleeding from your vagina that is not your period.      You have abdominal pain all the time.      Your abdomen is swollen.      You have feelings of fullness, pressure, or discomfort in your abdomen.      You have trouble urinating or emptying your bladder completely.      You have pain during sex.      You are losing weight without trying.      You have questions or concerns about your condition or care.    Medicines: You may need any of the following:     NSAIDs, such as ibuprofen, help decrease swelling, pain, and fever. This medicine is available with or without a doctor's order. NSAIDs can cause stomach bleeding or kidney problems in certain people. If you take blood thinner medicine, always ask if NSAIDs are safe for you. Always read the medicine label and follow directions. Do not give these medicines to children under 6 months of age without direction from your child's healthcare provider.      Birth control pills may help to control your periods, prevent cysts, or cause them to shrink.      Take your medicine as directed. Contact your healthcare provider if you think your medicine is not helping or if you have side effects. Tell him or her if you are allergic to any medicine. Keep a list of the medicines, vitamins, and herbs you take. Include the amounts, and when and why you take them. Bring the list or the pill bottles to follow-up visits. Carry your medicine list with you in case of an emergency.    Follow up with your healthcare provider as directed: Write down your questions so you remember to ask them during your visits.     Apply heat to decrease pain and cramping: Sit in a warm bath, or place a heating pad (turned on low) or a hot water bottle on your abdomen. Do this for 15 to 20 minutes every hour for as many days as directed.

## 2025-07-29 NOTE — ED PROVIDER NOTE - PATIENT PORTAL LINK FT
You can access the FollowMyHealth Patient Portal offered by [facility] by registering at the following website: [email or web address]. By joining [employer]’s FollowMyHealth portal, you will also be able to view your health information using other applications (apps) compatible with our system.

## 2025-07-29 NOTE — ED ADULT NURSE NOTE - PAIN RATING/NUMBER SCALE (0-10): ACTIVITY
Colposcopy  Date/Time: 7/27/2020 11:28 AM  Performed by: Sherley Kanner, MD  Authorized by: Sherley Kanner, MD     Consent:     Consent obtained:  Written    Consent given by:  Patient    Procedural risks discussed:  Bleeding    Patient questions answered: yes      Patient agrees, verbalizes understanding, and wants to proceed: yes    Pre-procedure:     Prepped with: acetic acid    Indication:     Indication:  LSIL  Procedure:     Procedure: Colposcopy of vagina w/ biopsy      Procedure comment:  Colposcopy of cervix and endocervical  biopsy    [place] speculum was placed in the vagina: yes      Under colposcopic examination the transition zone was seen in entirety: yes      Intracervical block was performed: no      Tampon inserted: no      Monsel's solution was applied: yes      Biopsy(s): yes      Location:  Right vaginal wall, and ECC    Specimen to pathology: yes    Post-procedure:     Findings: Bleeding      Impression: Low grade cervical dysplasia    Comments:      Patient had an ASCUS Pap with infection December 2019  Patient was treated with Metrogel and return to the office for repeat Pap smear which showed low-grade ASAD  On colposcopy today no lesions were seen on the cervix or in the endocervical canal   A flat aceto-white lesions were seen on the left and right vaginal wall towards the cervix  Lesions on the right were worse than the left  Biopsy was taken from the right vaginal wall    Impression:  VAIN  Plan:  Check biopsy  Patient is moving to [facility]    Will get the results to the patient as soon as they are reviewed
14-May-2023
0 (no pain/absence of nonverbal indicators of pain)

## 2025-07-29 NOTE — ED PROVIDER NOTE - CARE PLAN
Principal Discharge DX:	Threatened miscarriage   1 Principal Discharge DX:	Threatened miscarriage  Secondary Diagnosis:	Subchorionic hematoma  Secondary Diagnosis:	Ovarian cyst   Principal Discharge DX:	Threatened miscarriage  Secondary Diagnosis:	Subchorionic hematoma  Secondary Diagnosis:	Ovarian cyst  Secondary Diagnosis:	Elevated liver enzymes

## 2025-07-29 NOTE — ED ADULT TRIAGE NOTE - CHIEF COMPLAINT QUOTE
Pt c/o 10 weeks pregnant. Pt c/o soreness to pelvis started this morning and c/o vaginal bleeding around 9:20 pm. LMP 5/21/25

## 2025-07-29 NOTE — ED ADULT NURSE NOTE - OBJECTIVE STATEMENT
Pt A&Ox4; 10 weeks pregnant; endorses soreness to pelvis started this morning and c/o vaginal bleeding around 9pm

## 2025-07-30 VITALS
TEMPERATURE: 98 F | SYSTOLIC BLOOD PRESSURE: 112 MMHG | DIASTOLIC BLOOD PRESSURE: 72 MMHG | OXYGEN SATURATION: 99 % | RESPIRATION RATE: 16 BRPM | HEART RATE: 71 BPM

## 2025-07-30 LAB
ABO RH CONFIRMATION: SIGNIFICANT CHANGE UP
ALBUMIN SERPL ELPH-MCNC: 4 G/DL — SIGNIFICANT CHANGE UP (ref 3.5–5.2)
ALBUMIN SERPL ELPH-MCNC: 4.4 G/DL — SIGNIFICANT CHANGE UP (ref 3.5–5.2)
ALP SERPL-CCNC: 110 U/L — SIGNIFICANT CHANGE UP (ref 30–115)
ALP SERPL-CCNC: 98 U/L — SIGNIFICANT CHANGE UP (ref 30–115)
ALT FLD-CCNC: 25 U/L — SIGNIFICANT CHANGE UP (ref 0–41)
ALT FLD-CCNC: 26 U/L — SIGNIFICANT CHANGE UP (ref 0–41)
ANION GAP SERPL CALC-SCNC: 11 MMOL/L — SIGNIFICANT CHANGE UP (ref 7–14)
ANION GAP SERPL CALC-SCNC: 8 MMOL/L — SIGNIFICANT CHANGE UP (ref 7–14)
AST SERPL-CCNC: 56 U/L — HIGH (ref 0–41)
AST SERPL-CCNC: 63 U/L — HIGH (ref 0–41)
BILIRUB SERPL-MCNC: <0.2 MG/DL — SIGNIFICANT CHANGE UP (ref 0.2–1.2)
BILIRUB SERPL-MCNC: <0.2 MG/DL — SIGNIFICANT CHANGE UP (ref 0.2–1.2)
BUN SERPL-MCNC: 12 MG/DL — SIGNIFICANT CHANGE UP (ref 10–20)
BUN SERPL-MCNC: 12 MG/DL — SIGNIFICANT CHANGE UP (ref 10–20)
CALCIUM SERPL-MCNC: 8.5 MG/DL — SIGNIFICANT CHANGE UP (ref 8.4–10.5)
CALCIUM SERPL-MCNC: 9.4 MG/DL — SIGNIFICANT CHANGE UP (ref 8.4–10.5)
CHLORIDE SERPL-SCNC: 101 MMOL/L — SIGNIFICANT CHANGE UP (ref 98–110)
CHLORIDE SERPL-SCNC: 104 MMOL/L — SIGNIFICANT CHANGE UP (ref 98–110)
CO2 SERPL-SCNC: 21 MMOL/L — SIGNIFICANT CHANGE UP (ref 17–32)
CO2 SERPL-SCNC: 23 MMOL/L — SIGNIFICANT CHANGE UP (ref 17–32)
CREAT SERPL-MCNC: 0.7 MG/DL — SIGNIFICANT CHANGE UP (ref 0.7–1.5)
CREAT SERPL-MCNC: 0.7 MG/DL — SIGNIFICANT CHANGE UP (ref 0.7–1.5)
EGFR: 116 ML/MIN/1.73M2 — SIGNIFICANT CHANGE UP
GLUCOSE SERPL-MCNC: 94 MG/DL — SIGNIFICANT CHANGE UP (ref 70–99)
GLUCOSE SERPL-MCNC: 95 MG/DL — SIGNIFICANT CHANGE UP (ref 70–99)
HCG SERPL-ACNC: HIGH MIU/ML
POTASSIUM SERPL-MCNC: SIGNIFICANT CHANGE UP MMOL/L (ref 3.5–5)
POTASSIUM SERPL-MCNC: SIGNIFICANT CHANGE UP MMOL/L (ref 3.5–5)
POTASSIUM SERPL-SCNC: SIGNIFICANT CHANGE UP MMOL/L (ref 3.5–5)
POTASSIUM SERPL-SCNC: SIGNIFICANT CHANGE UP MMOL/L (ref 3.5–5)
PROT SERPL-MCNC: 7.8 G/DL — SIGNIFICANT CHANGE UP (ref 6–8)
PROT SERPL-MCNC: 8.7 G/DL — HIGH (ref 6–8)
SODIUM SERPL-SCNC: 133 MMOL/L — LOW (ref 135–146)
SODIUM SERPL-SCNC: 135 MMOL/L — SIGNIFICANT CHANGE UP (ref 135–146)

## 2025-07-30 PROCEDURE — 76817 TRANSVAGINAL US OBSTETRIC: CPT | Mod: 26

## 2025-07-30 PROCEDURE — 76815 OB US LIMITED FETUS(S): CPT | Mod: 26

## 2025-07-31 LAB
CULTURE RESULTS: SIGNIFICANT CHANGE UP
SPECIMEN SOURCE: SIGNIFICANT CHANGE UP

## 2025-08-12 ENCOUNTER — APPOINTMENT (OUTPATIENT)
Dept: OBGYN | Facility: CLINIC | Age: 35
End: 2025-08-12
Payer: MEDICAID

## 2025-08-12 ENCOUNTER — OUTPATIENT (OUTPATIENT)
Dept: OUTPATIENT SERVICES | Facility: HOSPITAL | Age: 35
LOS: 1 days | End: 2025-08-12
Payer: MEDICAID

## 2025-08-12 ENCOUNTER — LABORATORY RESULT (OUTPATIENT)
Age: 35
End: 2025-08-12

## 2025-08-12 ENCOUNTER — NON-APPOINTMENT (OUTPATIENT)
Age: 35
End: 2025-08-12

## 2025-08-12 VITALS
DIASTOLIC BLOOD PRESSURE: 78 MMHG | WEIGHT: 204 LBS | BODY MASS INDEX: 37.54 KG/M2 | SYSTOLIC BLOOD PRESSURE: 130 MMHG | HEIGHT: 62 IN

## 2025-08-12 DIAGNOSIS — Z34.90 ENCOUNTER FOR SUPERVISION OF NORMAL PREGNANCY, UNSPECIFIED, UNSPECIFIED TRIMESTER: ICD-10-CM

## 2025-08-12 DIAGNOSIS — Z98.891 HISTORY OF UTERINE SCAR FROM PREVIOUS SURGERY: Chronic | ICD-10-CM

## 2025-08-12 PROCEDURE — 87086 URINE CULTURE/COLONY COUNT: CPT

## 2025-08-12 PROCEDURE — 99214 OFFICE O/P EST MOD 30 MIN: CPT | Mod: TH,25

## 2025-08-12 PROCEDURE — 83020 HEMOGLOBIN ELECTROPHORESIS: CPT | Mod: 26

## 2025-08-12 PROCEDURE — 88142 CYTOPATH C/V THIN LAYER: CPT

## 2025-08-12 PROCEDURE — 76815 OB US LIMITED FETUS(S): CPT

## 2025-08-12 PROCEDURE — 76815 OB US LIMITED FETUS(S): CPT | Mod: 26

## 2025-08-12 PROCEDURE — 81329 SMN1 GENE DOS/DELETION ALYS: CPT

## 2025-08-12 PROCEDURE — 87661 TRICHOMONAS VAGINALIS AMPLIF: CPT

## 2025-08-12 PROCEDURE — 86765 RUBEOLA ANTIBODY: CPT

## 2025-08-12 PROCEDURE — 81243 FMR1 GEN ALY DETC ABNL ALLEL: CPT

## 2025-08-12 PROCEDURE — 81420 FETAL CHRMOML ANEUPLOIDY: CPT

## 2025-08-12 PROCEDURE — 81443 GENETIC TSTG SEVERE INH COND: CPT

## 2025-08-12 PROCEDURE — 86901 BLOOD TYPING SEROLOGIC RH(D): CPT

## 2025-08-12 PROCEDURE — 81003 URINALYSIS AUTO W/O SCOPE: CPT

## 2025-08-12 PROCEDURE — 83655 ASSAY OF LEAD: CPT

## 2025-08-12 PROCEDURE — 87624 HPV HI-RISK TYP POOLED RSLT: CPT

## 2025-08-12 PROCEDURE — 87389 HIV-1 AG W/HIV-1&-2 AB AG IA: CPT

## 2025-08-12 PROCEDURE — 81257 HBA1/HBA2 GENE: CPT

## 2025-08-12 PROCEDURE — 87591 N.GONORRHOEAE DNA AMP PROB: CPT

## 2025-08-12 PROCEDURE — 86762 RUBELLA ANTIBODY: CPT

## 2025-08-12 PROCEDURE — 86803 HEPATITIS C AB TEST: CPT

## 2025-08-12 PROCEDURE — 36415 COLL VENOUS BLD VENIPUNCTURE: CPT

## 2025-08-12 PROCEDURE — 86480 TB TEST CELL IMMUN MEASURE: CPT

## 2025-08-12 PROCEDURE — 85025 COMPLETE CBC W/AUTO DIFF WBC: CPT

## 2025-08-12 PROCEDURE — 81422 FETAL CHRMOML MICRODELTJ: CPT

## 2025-08-12 PROCEDURE — 87491 CHLMYD TRACH DNA AMP PROBE: CPT

## 2025-08-12 PROCEDURE — 87340 HEPATITIS B SURFACE AG IA: CPT

## 2025-08-12 PROCEDURE — 86735 MUMPS ANTIBODY: CPT

## 2025-08-12 PROCEDURE — 81222 CFTR GENE DUP/DELET VARIANTS: CPT

## 2025-08-12 PROCEDURE — 83020 HEMOGLOBIN ELECTROPHORESIS: CPT

## 2025-08-12 PROCEDURE — 86780 TREPONEMA PALLIDUM: CPT

## 2025-08-12 PROCEDURE — 86850 RBC ANTIBODY SCREEN: CPT

## 2025-08-12 PROCEDURE — 99204 OFFICE O/P NEW MOD 45 MIN: CPT

## 2025-08-12 PROCEDURE — 86900 BLOOD TYPING SEROLOGIC ABO: CPT

## 2025-08-12 PROCEDURE — 86787 VARICELLA-ZOSTER ANTIBODY: CPT

## 2025-08-12 PROCEDURE — 81220 CFTR GENE COM VARIANTS: CPT

## 2025-08-12 RX ORDER — PSEUDOEPHEDRINE HCL 30 MG
27-0.8 TABLET ORAL
Qty: 60 | Refills: 0 | Status: ACTIVE | COMMUNITY
Start: 2025-08-12 | End: 1900-01-01

## 2025-08-12 RX ORDER — KRILL/OM-3/DHA/EPA/PHOSPHO/AST 1000-230MG
81 CAPSULE ORAL DAILY
Qty: 60 | Refills: 0 | Status: ACTIVE | COMMUNITY
Start: 2025-08-12 | End: 1900-01-01

## 2025-08-13 ENCOUNTER — OUTPATIENT (OUTPATIENT)
Dept: OUTPATIENT SERVICES | Facility: HOSPITAL | Age: 35
LOS: 1 days | End: 2025-08-13

## 2025-08-13 DIAGNOSIS — Z98.891 HISTORY OF UTERINE SCAR FROM PREVIOUS SURGERY: Chronic | ICD-10-CM

## 2025-08-13 DIAGNOSIS — Z34.90 ENCOUNTER FOR SUPERVISION OF NORMAL PREGNANCY, UNSPECIFIED, UNSPECIFIED TRIMESTER: ICD-10-CM

## 2025-08-14 DIAGNOSIS — Z34.90 ENCOUNTER FOR SUPERVISION OF NORMAL PREGNANCY, UNSPECIFIED, UNSPECIFIED TRIMESTER: ICD-10-CM

## 2025-08-17 LAB
ABORH: NORMAL
ANTIBODY SCREEN: NORMAL
APPEARANCE: CLEAR
BACTERIA UR CULT: NORMAL
BASOPHILS # BLD AUTO: 0.05 K/UL
BASOPHILS # BLD AUTO: 0.05 K/UL
BASOPHILS NFR BLD AUTO: 0.6 %
BASOPHILS NFR BLD AUTO: 0.6 %
BILIRUBIN URINE: NEGATIVE
BLOOD URINE: NEGATIVE
C TRACH RRNA SPEC QL NAA+PROBE: NOT DETECTED
COLOR: YELLOW
EOSINOPHIL # BLD AUTO: 0.15 K/UL
EOSINOPHIL # BLD AUTO: 0.27 K/UL
EOSINOPHIL NFR BLD AUTO: 1.8 %
EOSINOPHIL NFR BLD AUTO: 3.3 %
GLUCOSE QUALITATIVE U: NEGATIVE MG/DL
HBV SURFACE AG SER QL: NONREACTIVE
HCT VFR BLD CALC: 36.9 %
HCT VFR BLD CALC: 36.9 %
HCV AB SER QL: NONREACTIVE
HCV S/CO RATIO: 0.05 COI
HGB A MFR BLD: 97.2 %
HGB A2 MFR BLD: 2.8 %
HGB BLD-MCNC: 11.8 G/DL
HGB BLD-MCNC: 11.9 G/DL
HGB FRACT BLD-IMP: NORMAL
HIV1+2 AB SPEC QL IA.RAPID: NONREACTIVE
IMM GRANULOCYTES NFR BLD AUTO: 0.2 %
IMM GRANULOCYTES NFR BLD AUTO: 0.2 %
KETONES URINE: NEGATIVE MG/DL
LEAD BLD-MCNC: <1 UG/DL
LEUKOCYTE ESTERASE URINE: NEGATIVE
LYMPHOCYTES # BLD AUTO: 2 K/UL
LYMPHOCYTES # BLD AUTO: 2.24 K/UL
LYMPHOCYTES NFR BLD AUTO: 24.4 %
LYMPHOCYTES NFR BLD AUTO: 26.9 %
M TB IFN-G BLD-IMP: NEGATIVE
MAN DIFF?: NORMAL
MAN DIFF?: NORMAL
MCHC RBC-ENTMCNC: 26.9 PG
MCHC RBC-ENTMCNC: 27 PG
MCHC RBC-ENTMCNC: 32 G/DL
MCHC RBC-ENTMCNC: 32.2 G/DL
MCV RBC AUTO: 83.9 FL
MCV RBC AUTO: 84.2 FL
MEV IGG FLD QL IA: 96 AU/ML
MEV IGG+IGM SER-IMP: POSITIVE
MONOCYTES # BLD AUTO: 0.56 K/UL
MONOCYTES # BLD AUTO: 0.56 K/UL
MONOCYTES NFR BLD AUTO: 6.7 %
MONOCYTES NFR BLD AUTO: 6.8 %
MUV AB SER-ACNC: NEGATIVE
MUV IGG SER QL IA: <5 AU/ML
N GONORRHOEA RRNA SPEC QL NAA+PROBE: NOT DETECTED
NEUTROPHILS # BLD AUTO: 5.3 K/UL
NEUTROPHILS # BLD AUTO: 5.31 K/UL
NEUTROPHILS NFR BLD AUTO: 63.8 %
NEUTROPHILS NFR BLD AUTO: 64.7 %
NITRITE URINE: NEGATIVE
PH URINE: 6.5
PLATELET # BLD AUTO: 299 K/UL
PLATELET # BLD AUTO: 321 K/UL
PMV BLD AUTO: 0 /100 WBCS
PMV BLD: 12.1 FL
PROTEIN URINE: NEGATIVE MG/DL
QUANTIFERON TB PLUS MITOGEN MINUS NIL: 7.1 IU/ML
QUANTIFERON TB PLUS NIL: 0.06 IU/ML
QUANTIFERON TB PLUS TB1 MINUS NIL: 0.05 IU/ML
QUANTIFERON TB PLUS TB2 MINUS NIL: 0.06 IU/ML
RBC # BLD: 4.38 M/UL
RBC # BLD: 4.4 M/UL
RBC # FLD: 15 %
RBC # FLD: 15.3 %
RUBV IGG FLD-ACNC: 0.84 INDEX
RUBV IGG SER-IMP: NEGATIVE
SOURCE AMPLIFICATION: NORMAL
SPECIFIC GRAVITY URINE: 1.02
T PALLIDUM AB SER QL IA: NEGATIVE
T VAGINALIS RRNA SPEC QL NAA+PROBE: NOT DETECTED
UROBILINOGEN URINE: 0.2 MG/DL
VZV AB TITR SER: POSITIVE
VZV IGG SER IF-ACNC: 2.06 S/CO
WBC # FLD AUTO: 8.2 K/UL
WBC # FLD AUTO: 8.33 K/UL

## 2025-08-21 LAB
3-BETA-HYDROXYSTEROID DEHYDROGENASE II: NEGATIVE
3-HYDROXY-3-METHYLGLUTARYL-COA LYASE DEF: NEGATIVE
3-METHYLCROTONYL-COA CARBOXYLASE 1 DEFIC: NEGATIVE
3-METHYLCROTONYL-COA CARBOXYLASE 2 DEFIC: NEGATIVE
3-PHOSPHOGLYCERATE DEHYDROGENASE DEFICIE: NEGATIVE
6-PYRUVOYL-TETRAHYDROPTERIN SYNTHASE: NEGATIVE
ABETALIPOPROTEINEMIA: NEGATIVE
ACHONDROGENESIS, TYPE 1B: NEGATIVE
ACHROMATOPSIA: NEGATIVE
ACRODERMATITIS ENTEROPATHICA: NEGATIVE
ACUTE INFANTILE LIVER FAILURE: NEGATIVE
ACYL-COA OXIDASE I DEFICIENCY: NEGATIVE
ADRENOLEUKODYSTROPHY: NEGATIVE
AICARDI-GOUTIÈRES SYNDROME: NEGATIVE
ALPHA THALASSEMIA MENTAL RETARDATION SYN: NEGATIVE
ALPHA-MANNOSIDOSIS: NEGATIVE
ALPHA-THALASSEMIA: NEGATIVE
ALPORT SYNDROME, COL4A3-RELATED: NEGATIVE
ALPORT SYNDROME, COL4A4-RELATED: NEGATIVE
ALPORT SYNDROME, X-LINKED: NEGATIVE
ALSTROM SYNDROME: NEGATIVE
ANDERMANN SYNDROME: NEGATIVE
ARGININOSUCCINATE LYASE DEFICIENCY: NEGATIVE
AROMATASE DEFICIENCY: NEGATIVE
ASPARAGINE SYNTHETASE DEFICIENCY: NEGATIVE
ASPARTYLGLYCOSAMINURIA: NEGATIVE
ATAXIA WITH VITAMIN E DEFICIENCY: NEGATIVE
ATAXIA-TELANGIECTASIA: NEGATIVE
AUTISM SPECTRUM, EPILEPSY AND ARTHROGRYP: NEGATIVE
AUTOIMMUNE POLYGLANDULAR SYNDROME: NEGATIVE
AUTOSOMAL RECESSIVE SPASTIC ATAXIA OF CH: NEGATIVE
BARDET-BIEDL SYNDROME, BBS1-RELATED: NEGATIVE
BARDET-BIEDL SYNDROME, BBS10-RELATED: NEGATIVE
BARDET-BIEDL SYNDROME, BBS12-RELATED: NEGATIVE
BARDET-BIEDL SYNDROME, BBS2-RELATED: NEGATIVE
BARE LYMPHOCYTE SYNDROME, TYPE II: NEGATIVE
BARTTER SYNDROME: NEGATIVE
BATTEN DISEASE: NEGATIVE
BETA-HEMOGLOBINOPATHIES: NEGATIVE
BILATERAL FRONTOPARIETAL POLYMICROGYRIA: NEGATIVE
BIOTINIDASE DEFICIENCY: NEGATIVE
BLOOM SYNDROME: NEGATIVE
CANAVAN DISEASE: NEGATIVE
CARBAMOYL PHOSPHATE SYNTHETASE I DEF: NEGATIVE
CARNITINE DEFICIENCY: NEGATIVE
CARNITINE PALMITOYLTRANSFERASE IA DEF: NEGATIVE
CARNITINE PALMITOYLTRANSFERASE II DEF: NEGATIVE
CARPENTER SYNDROME: NEGATIVE
CARTILAGE-HAIR HYPOPLASIA: NEGATIVE
CEREBROTENDINOUS XANTHOMATOSIS: NEGATIVE
CFTR MUT ANL BLD/T: NEGATIVE
CHARCOT-MARIE-TOOTH DISEASE WITH DEAFNES: NEGATIVE
CHARCOT-MARIE-TOOTH DISEASE, TYPE 4D: NEGATIVE
CHOREOACANTHOCYTOSIS: NEGATIVE
CHOROIDEREMIA: NEGATIVE
CHRONIC GRANULOMATOUS DISEASE, CYBA-REL: NEGATIVE
CHRONIC GRANULOMATOUS DISEASE, X-LINKED: NEGATIVE
CILIOPATHIES, RPGRIP1L-RELATED: NEGATIVE
CITRIN DEFICIENCY: NEGATIVE
CITRULLINEMIA, TYPE 1: NEGATIVE
COHEN SYNDROME: NEGATIVE
COMBINED MALONIC AND METHYLMALONIC ACIDU: NEGATIVE
COMBINED OXIDATIVE PHOSPHORYLATION DEF 3: NEGATIVE
COMBINED OXIDATIVE PHOSPHORYLATION DEF 4: NEGATIVE
COMBINED PITUITARY HORMONE DEFICIENCY-2: NEGATIVE
CONGENITAL ADRENAL HYPERPLASIA, 17-ALPHA: NEGATIVE
CONGENITAL AMEGAKARYOCYTIC THROMBOCYTOPE: NEGATIVE
CONGENITAL DISORDER OF GLYCOSYLATION 1A: NEGATIVE
CONGENITAL DISORDER OF GLYCOSYLATION 1C: NEGATIVE
CONGENITAL DISORDER OF GLYCOSYLATION IB: NEGATIVE
CONGENITAL FINNISH NEPHROSIS: NEGATIVE
CONGENITAL INSENSIVITY TO PAIN WITH ANHI: NEGATIVE
CONGENITAL MYASTHENIC SYNDROME, CHRNE: NEGATIVE
CONGENITAL MYASTHENIC SYNDROME, RAPSN-RE: NEGATIVE
CONGENITAL NEUTROPENIA, HAX1-RELATED: NEGATIVE
CONGENITAL NEUTROPENIA, VPS45-RELATED: NEGATIVE
CORNEAL DYSTROPHY AND PERCEPTIVE DEAFNES: NEGATIVE
CORTICOSTERONE METHYLOXIDASE DEFICIENCY: NEGATIVE
COSTEFF SYNDROME: NEGATIVE
CRB1-RELATED RETINAL DYSTROPHIES: NEGATIVE
CREATINE TRANSPORTER DEFECT: NEGATIVE
CYSTINOSIS: NEGATIVE
D-BIFUNCTIONAL PROTEIN DEFICIENCY: NEGATIVE
DEAFNESS, AUTOSOMAL RECESSIVE 77: NEGATIVE
DMD GENE MUT ANL BLD/T: NEGATIVE
DYSKERATOSIS CONGENITA: NEGATIVE
DYSTROPHIC EPIDERMOLYSIS BULLOSA: NEGATIVE
EHLERS-DANLOS SYNDROME, TYPE VIIC: NEGATIVE
ELLIS-VAN CREVELD SYNDOME: NEGATIVE
EMERY-DREIFUSS MUSCULAR DYSTROPHY 1 X-LI: NEGATIVE
ENHANCED S-CONE SYNDROME: NEGATIVE
ETHYLMALONIC ENCEPHALOPATHY: NEGATIVE
FABRY DISEASE: NEGATIVE
FACTOR IX DEFICIENCY: NEGATIVE
FACTOR XI DEFICIENCY: NEGATIVE
FAMILIAL DYSAUTONOMIA: NEGATIVE
FAMILIAL HYPERCHOLESTEROLEMIA, LDLR: NEGATIVE
FAMILIAL HYPERCHOLESTEROLEMIA, LDLRAP1: NEGATIVE
FAMILIAL HYPERINSULINISM: NEGATIVE
FAMILIAL MEDITERRANEAN FEVER: NEGATIVE
FAMILIAL NEUROPOPHYSEAL DIABETES INSIPID: NEGATIVE
FANCONI ANEMIA, GROUP A: NEGATIVE
FANCONI ANEMIA, GROUP C: NEGATIVE
FANCONI ANEMIA, GROUP G: NEGATIVE
FRAGILE X PROTEIN (FMRP) BLD-IMP: NEGATIVE
FUMARASE DEFICIENCY: NEGATIVE
GALACTOKINASE DEFICIENCY: NEGATIVE
GALACTOSEMIA: NEGATIVE
GAUCHER DISEASE: NEGATIVE
GENE DIS ANL CARRIER INTERP-IMP: POSITIVE
GITELMAN SYNDROME: NEGATIVE
GLUTARIC ACIDEMIA, TYPE 2A: NEGATIVE
GLUTARIC ACIDEMIA, TYPE 2C: NEGATIVE
GLUTARYL-COA DEHYDROGENASE DEFICIENCY: NEGATIVE
GLYCINE ENCEPHALOPATHY, AMT-RELATED: NEGATIVE
GLYCINE ENCEPHALOPATHY: NEGATIVE
GLYCOGEN STORAGE DISEASE, TYPE 1A: NEGATIVE
GLYCOGEN STORAGE DISEASE, TYPE 2: NEGATIVE
GLYCOGEN STORAGE DISEASE, TYPE 3: NEGATIVE
GLYCOGEN STORAGE DISEASE, TYPE 4: NEGATIVE
GLYCOGEN STORAGE DISEASE, TYPE 5: NEGATIVE
GLYCOGEN STORAGE DISEASE, TYPE 7: NEGATIVE
GLYCOGEN STORAGE DISEASE, TYPE IB: NEGATIVE
GRACILE SYNDROME: NEGATIVE
GUANIDINOACETATE METHYLTRANSFERASE DEFIC: NEGATIVE
HEMOCHROMATOSIS, TYPE 2A: NEGATIVE
HEMOCHROMATOSIS, TYPE 3, TFR2-RELATED: NEGATIVE
HEREDITARY FRUCTOSE INTOLERANCE: NEGATIVE
HEREDITARY SPASTIC PARAPARESIS, TYPE 49: NEGATIVE
HERMANSKY-PUDLAK SYNDROME, HPS1-RELATED: NEGATIVE
HERMANSKY-PUDLAK SYNDROME, HPS3-RELATED: NEGATIVE
HEXOSAMINIDASE A & B BLD-IMP: NEGATIVE
HOLOCARBOXYLASE SYNTHETASE DEFICIENCY: NEGATIVE
HOMOCYSTINURIA DUE TO DEFIC OF MTHFR: NEGATIVE
HOMOCYSTINURIA, TYPE CBLE: NEGATIVE
HOMOCYSTINURIA: NEGATIVE
HYDROLETHALUS SYNDROME: NEGATIVE
HYPERINSULINEMIC HYPOGLYCEMIA: NEGATIVE
HYPERORNITHINEMIA-HYPERAMMONEMIA-HOMOCIT: NEGATIVE
HYPOHIDROTIC ECTODERMAL DYSPLASIA X-LINK: NEGATIVE
HYPOPHOSPHATASIA, AUTOSOMAL RECESSIVE: NEGATIVE
INCLUSION BODY MYOPATHY 2: NEGATIVE
INFANTILE CEREBRAL AND CEREBELLAR ATROPH: NEGATIVE
ISOVALERIC ACIDEMIA: POSITIVE
JOUBERT SYNDROME 2: NEGATIVE
KETOTHIOLASE DEFICIENCY: NEGATIVE
KRABBE DISEASE: NEGATIVE
LAMELLAR ICHTHYOSIS, TYPE 1: NEGATIVE
LEBER CONGENITAL AMAUROSIS 2: NEGATIVE
LEBER CONGENITAL AMAUROSIS, TYPE CEP290: NEGATIVE
LEBER CONGENITAL AMAUROSIS, TYPE LCA5: NEGATIVE
LEBER CONGENITAL AMAUROSIS, TYPE RDH12: NEGATIVE
LEIGH SYNDROME, FRENCH-CANADIAN TYPE: NEGATIVE
LETHAL CONGENITAL CONTRACTURE SYNDROME 1: NEGATIVE
LEUKOENCEPHALOPATHY WITH VANISHING WHITE: NEGATIVE
LIMB GIRDLE MUSCULAR DYSTROPHY, TYPE 2A: NEGATIVE
LIMB GIRDLE MUSCULAR DYSTROPHY, TYPE 2I: NEGATIVE
LIMB-GIRDLE MUSCULAR DYSTROPHY, TYPE 2B: NEGATIVE
LIMB-GIRDLE MUSCULAR DYSTROPHY, TYPE 2C: NEGATIVE
LIMB-GIRDLE MUSCULAR DYSTROPHY, TYPE 2D: NEGATIVE
LIMB-GIRDLE MUSCULAR DYSTROPHY, TYPE 2E: NEGATIVE
LIPOAMIDE DEHYDROGENASE DEFICIENCY: NEGATIVE
LIPOID ADRENAL HYPERPLASIA: NEGATIVE
LIPOPROTEIN LIPASE DEFICIENCY: NEGATIVE
LONG CHAIN 3-HYDROXYACYL-COA DEHYDROGENA: NEGATIVE
LYSINURIC PROTEIN INTOLERANCE: NEGATIVE
MAPLE SYRUP URINE DISEASE, TYPE 1A: NEGATIVE
MAPLE SYRUP URINE DISEASE, TYPE 1B: NEGATIVE
MECKEL-GRUBER SYNDROME, TYPE 1: NEGATIVE
MEDIUM CHAIN ACYL-COA DEHYDROGENASE DEF: NEGATIVE
MEGALENCEPHALIC LEUKOENCEPHALOPATHY: NEGATIVE
METACHROMATIC LEUKODYSTROPHY GAUCHER: NEGATIVE
METACHROMATIC LEUKODYSTROPHY, ARSA: NEGATIVE
METHYLMALONIC ACIDURIA AND HOMOCYST CBIC: NEGATIVE
METHYLMALONIC ACIDURIA AND HOMOCYST CBID: NEGATIVE
METHYLMALONIC ACIDURIA, MMAA-RELATED: NEGATIVE
METHYLMALONIC ACIDURIA, MMAB-RELATED: NEGATIVE
METHYLMALONIC ACIDURIA, TYPE MUT(0): NEGATIVE
MICROPHTHALMIA/ANOPHTHALMIA: NEGATIVE
MITOCHONDRIAL COMPLEX 1 DEFIC NDUFAF5: NEGATIVE
MITOCHONDRIAL COMPLEX 1 DEFIC NDUFS6: NEGATIVE
MITOCHONDRIAL DNA DEPLETION SYNDROME 6: NEGATIVE
MITOCHONDRIAL MYOPATHY AND SIDEROBLASTIC: NEGATIVE
MUCOLIPIDOSIS II/IIIA: NEGATIVE
MUCOLIPIDOSIS III GAMMA: NEGATIVE
MUCOLIPIDOSIS, TYPE IV: NEGATIVE
MUCOPOLYSACCHARIDOSIS, TYPE I: NEGATIVE
MUCOPOLYSACCHARIDOSIS, TYPE II: NEGATIVE
MUCOPOLYSACCHARIDOSIS, TYPE IIIA: NEGATIVE
MUCOPOLYSACCHARIDOSIS, TYPE IIIB: NEGATIVE
MUCOPOLYSACCHARIDOSIS, TYPE IIIC: NEGATIVE
MUCOPOLYSACCHARIDOSIS, TYPE IIID: NEGATIVE
MUCOPOLYSACCHARIDOSIS, TYPE IVB: NEGATIVE
MUCOPOLYSACCHARIDOSIS, TYPE IX: NEGATIVE
MUCOPOLYSACCHARIDOSIS, TYPE VI: NEGATIVE
MULTIPLE SULPHATASE DEFICIENCY: NEGATIVE
MUSCLE-EYE-BRAIN DISEASE, POMGNT1-RELATE: NEGATIVE
MYONEUROGASTROINTESTINAL ENCEPHALOPATHY: NEGATIVE
MYOTUBULAR MYOPATHY, X-LINKED: NEGATIVE
N-ACETYLGLUTAMATE SYNTHASE DEFICIENCY: NEGATIVE
NEMALINE MYOPATHY: NEGATIVE
NEURONAL CEROID LIPOFUSCINOSIS, CLN5: NEGATIVE
NEURONAL CEROID LIPOFUSCINOSIS, MFSD8: NEGATIVE
NEURONAL CEROID LIPOFUSCINOSIS, PPT1-REL: NEGATIVE
NEURONAL CEROID LIPOFUSCINOSIS, TPP1-REL: NEGATIVE
NEURONAL CEROID-LIPOFUSCINOSIS, CLN6: NEGATIVE
NEURONAL CEROID-LIPOFUSCINOSIS, CLN8: NEGATIVE
NIEMANN PICK DISEASE, TYPE C1/D: NEGATIVE
NIEMANN PICK DISEASE, TYPE C2: NEGATIVE
NIEMANN-PICK DISEASE, TYPES A/B: NEGATIVE
NIJMEGEN BREAKAGE SYNDROME: NEGATIVE
NON-SYNDROMIC HEARING LOSS, GJB2-RELATED: NEGATIVE
OCCIPITAL HORN SYNDROME: NEGATIVE
ODONTO-ONYCHO-DERMAL DYSPLASIA: NEGATIVE
OMENN SYNDROME: NEGATIVE
ORNITHINE AMINOTRANSFERASE DEFICIENCY: NEGATIVE
ORNITHINE TRANSCARBAMYLASE DEFICIENCY: NEGATIVE
OSTEOPETROSIS, INFANTILE MALIGNANT: NEGATIVE
PANEL NOTES: NORMAL
PENDRED SYNDROME: NEGATIVE
PHENYLKETONURIA: NEGATIVE
PITUITARY HORMONE DEFICIENCY, COMBINED 3: NEGATIVE
POLYCYSTIC KIDNEY DISEASE, AUTOSOMAL REC: NEGATIVE
PONTOCEREBELLAR HYPOPLASIA, RARS2-RELATE: NEGATIVE
PONTOCEREBELLAR HYPOPLASIA, TYPE 1A: NEGATIVE
PONTOCEREBELLAR HYPOPLASIA, TYPE 2D: NEGATIVE
PRIMARY CILIARY DYSKINESIA DNAH5-RELATED: NEGATIVE
PRIMARY CILIARY DYSKINESIA DNAI1-RELATED: NEGATIVE
PRIMARY CILIARY DYSKINESIA DNAI2-RELATED: NEGATIVE
PRIMARY HYPEROXALURIA, TYPE 1: NEGATIVE
PRIMARY HYPEROXALURIA, TYPE 2: NEGATIVE
PRIMARY HYPEROXALURIA, TYPE 3: NEGATIVE
PROGRESSIVE FAMILIAL INTRAHEPATIC CHOLES: NEGATIVE
PROPIONIC ACIDEMIA, ALPHA SUBUNIT: NEGATIVE
PROPIONIC ACIDEMIA, BETA SUBUNIT: NEGATIVE
PYCNODYSOSTOSIS: NEGATIVE
PYRUVATE DEHYDROGENASE DEFICIENCY AUTOSO: NEGATIVE
PYRUVATE DEHYDROGENASE DEFICIENCY X-LINK: NEGATIVE
RENAL TUBULAR ACIDOSIS AND DEAFNESS: NEGATIVE
RETINITIS PIGMENTOSA 25: NEGATIVE
RETINITIS PIGMENTOSA 26: NEGATIVE
RETINITIS PIGMENTOSA 28: NEGATIVE
RETINITIS PIGMENTOSA 59: NEGATIVE
RHIZOMELIC CHONDRODYSPLASIA PUNCTATA 1: NEGATIVE
RHIZOMELIC CHONDRODYSPLASIA PUNCTATA 3: NEGATIVE
RIBOFLAVIN RESPONSIVE COMPLEX 1 DEF: NEGATIVE
ROBERTS SYNDROME: NEGATIVE
RPT COMMENT: NORMAL
SALLA DISEASE: NEGATIVE
SANDHOFF DISEASE: NEGATIVE
SCHIMKE IMMUNOOSSEOUS DYSPLASIA: NEGATIVE
SEGAWA SYNDROME, AUTOSOMAL RECESSIVE: NEGATIVE
SEVERE COMBINED IMMUNODEFICIENCY, TYPE A: NEGATIVE
SEVERE COMBINED IMMUNODEFICIENCY: NEGATIVE
SJOGREN-LARSSON SYNDROME: NEGATIVE
SMITH-LEMLI-OPITZ SYNDROME: NEGATIVE
SMN1 GENE MUT ANL BLD/T: NEGATIVE
SPONDYLOTHORACIC DYSOSTOSIS: NEGATIVE
STEROID-RESISTANT NEPHROTIC SYNDROME: NEGATIVE
STUVE-WIEDEMANN SYNDROME: NEGATIVE
TEST PERFORMANCE INFO SPEC: NORMAL
TYROSINEMIA, TYPE I: NEGATIVE
USHER SYNDROME, TYPE 1B: NEGATIVE
USHER SYNDROME, TYPE 1C: NEGATIVE
USHER SYNDROME, TYPE 1D: NEGATIVE
USHER SYNDROME, TYPE 1F: NEGATIVE
USHER SYNDROME, TYPE 2A: NEGATIVE
USHER SYNDROME, TYPE 3: NEGATIVE
VERY LONG CHAIN ACYL-COA DEHYDROGENASE: NEGATIVE
WALKER-WARBURG SYNDROME: NEGATIVE
WILSON DISEASE: NEGATIVE
WOLMAN DISEASE: NEGATIVE
X-LINKED JUVENILE RETINOSCHISIS: NEGATIVE
X-LINKED SEVERE COMBINED IMMUNODEFICIENC: NEGATIVE
ZELLWEGER SPECTRUM DISORDERS, PEX1-RELAT: NEGATIVE
ZELLWEGER SPECTRUM DISORDERS, PEX10-RELA: NEGATIVE
ZELLWEGER SPECTRUM DISORDERS, PEX2-RELAT: NEGATIVE
ZELLWEGER SPECTRUM DISORDERS, PEX6-RELAT: NEGATIVE

## 2025-08-22 LAB
1P36 DELETION SYN POPULATION-BASED RISK: NORMAL
1P36 DELETION SYNDROME RESULT: NORMAL
1P36 DELETION SYNDROME RISK AFTER TEST: NORMAL
22Q11.2 DELETION POPULATION-BASED RISK: NORMAL
22Q11.2 DELETION RISK AFTER TEST: NORMAL
22Q11.2 DELETION SYNDROME RESULT: NORMAL
ANGELMAN SYNDROME POPULATION-BASED RISK: NORMAL
ANGELMAN SYNDROME RESULT: NORMAL
ANGELMAN SYNDROME RISK AFTER TEST: NORMAL
CRI-DU-CHAT SYNDR POPULATION-BASED RISK: NORMAL
CRI-DU-CHAT SYNDROME RESULT: NORMAL
CRI-DU-CHAT SYNDROME RISK AFTER TEST: NORMAL
FETAL FRACTION: NORMAL
GENDER OF FETUS: NORMAL
GENE DIS ANL CARRIER INTERP-IMP: NORMAL
MONOSOMY X AGE-BASED RISK: NORMAL
MONOSOMY X RESULT: NORMAL
MONOSOMY X RISK AFTER TEST: NORMAL
PANORAMA SCREEN: NORMAL
PRADER-WILLI SYND POPULATION-BASED RISK: NORMAL
PRADER-WILLI SYNDROME RESULT: NORMAL
PRADER-WILLI SYNDROME RISK AFTER TEST: NORMAL
RHD OPTION: NORMAL
RPT COMMENT: NORMAL
TEST PERFORMANCE INFO SPEC: NORMAL
TRIPLOIDY RESULT: NORMAL
TRISOMY 13 AGE-BASED RISK: NORMAL
TRISOMY 13 RESULT: NORMAL
TRISOMY 13 RISK AFTER TEST: NORMAL
TRISOMY 18 AGE-BASED RISK: NORMAL
TRISOMY 18 RESULT: NORMAL
TRISOMY 18 RISK AFTER TEST: NORMAL
TRISOMY 21 RESULT: NORMAL
TRISOMY 21 RISK AFTER TEST: NORMAL

## 2025-08-25 ENCOUNTER — APPOINTMENT (OUTPATIENT)
Dept: OBGYN | Facility: CLINIC | Age: 35
End: 2025-08-25

## 2025-08-25 ENCOUNTER — NON-APPOINTMENT (OUTPATIENT)
Age: 35
End: 2025-08-25

## 2025-09-09 ENCOUNTER — APPOINTMENT (OUTPATIENT)
Dept: OBGYN | Facility: CLINIC | Age: 35
End: 2025-09-09
Payer: MEDICAID

## 2025-09-09 VITALS — DIASTOLIC BLOOD PRESSURE: 72 MMHG | BODY MASS INDEX: 37.13 KG/M2 | WEIGHT: 203 LBS | SYSTOLIC BLOOD PRESSURE: 115 MMHG

## 2025-09-09 DIAGNOSIS — Z14.8 GENETIC CARRIER OF OTHER DISEASE: ICD-10-CM

## 2025-09-09 DIAGNOSIS — L30.4 ERYTHEMA INTERTRIGO: ICD-10-CM

## 2025-09-09 PROCEDURE — 99213 OFFICE O/P EST LOW 20 MIN: CPT | Mod: TH,25

## 2025-09-09 RX ORDER — CLOTRIMAZOLE 1 G/100G
1 CREAM TOPICAL 3 TIMES DAILY
Qty: 2 | Refills: 0 | Status: ACTIVE | COMMUNITY
Start: 2025-09-09 | End: 1900-01-01

## 2025-09-10 ENCOUNTER — APPOINTMENT (OUTPATIENT)
Dept: OBGYN | Facility: CLINIC | Age: 35
End: 2025-09-10

## 2025-09-10 LAB
BILIRUB UR QL STRIP: NEGATIVE
CLARITY UR: CLEAR
COLLECTION METHOD: NORMAL
GLUCOSE UR-MCNC: NEGATIVE
HCG UR QL: NEGATIVE EU/DL
HGB UR QL STRIP.AUTO: NEGATIVE
KETONES UR-MCNC: NEGATIVE
LEUKOCYTE ESTERASE UR QL STRIP: NEGATIVE
NITRITE UR QL STRIP: NEGATIVE
PH UR STRIP: 6
PROT UR STRIP-MCNC: NEGATIVE
SP GR UR STRIP: 1.02